# Patient Record
Sex: FEMALE | Race: WHITE | Employment: FULL TIME | URBAN - METROPOLITAN AREA
[De-identification: names, ages, dates, MRNs, and addresses within clinical notes are randomized per-mention and may not be internally consistent; named-entity substitution may affect disease eponyms.]

---

## 2018-01-18 ENCOUNTER — APPOINTMENT (OUTPATIENT)
Dept: GENERAL RADIOLOGY | Facility: HOSPITAL | Age: 58
End: 2018-01-18
Attending: EMERGENCY MEDICINE

## 2018-01-18 ENCOUNTER — APPOINTMENT (OUTPATIENT)
Dept: CV DIAGNOSTICS | Facility: HOSPITAL | Age: 58
End: 2018-01-18
Attending: EMERGENCY MEDICINE

## 2018-01-18 ENCOUNTER — HOSPITAL ENCOUNTER (EMERGENCY)
Facility: HOSPITAL | Age: 58
Discharge: HOME | End: 2018-01-18
Attending: EMERGENCY MEDICINE

## 2018-01-18 VITALS
RESPIRATION RATE: 20 BRPM | SYSTOLIC BLOOD PRESSURE: 135 MMHG | OXYGEN SATURATION: 98 % | DIASTOLIC BLOOD PRESSURE: 89 MMHG | BODY MASS INDEX: 30.53 KG/M2 | WEIGHT: 190 LBS | HEIGHT: 66 IN | TEMPERATURE: 99 F | HEART RATE: 63 BPM

## 2018-01-18 DIAGNOSIS — R07.89 CHEST PAIN, ATYPICAL: Primary | ICD-10-CM

## 2018-01-18 LAB
ALBUMIN SERPL-MCNC: 3.9 G/DL (ref 3.5–4.8)
ALP LIVER SERPL-CCNC: 64 U/L (ref 46–118)
ALT SERPL-CCNC: 98 U/L (ref 14–54)
APTT PPP: 37.2 SECONDS (ref 25–34)
AST SERPL-CCNC: 70 U/L (ref 15–41)
BASOPHILS # BLD AUTO: 0.03 X10(3) UL (ref 0–0.1)
BASOPHILS NFR BLD AUTO: 0.8 %
BILIRUB SERPL-MCNC: 1.1 MG/DL (ref 0.1–2)
BUN BLD-MCNC: 11 MG/DL (ref 8–20)
CALCIUM BLD-MCNC: 8.7 MG/DL (ref 8.3–10.3)
CHLORIDE: 108 MMOL/L (ref 101–111)
CO2: 24 MMOL/L (ref 22–32)
CREAT BLD-MCNC: 0.68 MG/DL (ref 0.55–1.02)
D-DIMER: <0.27 UG/ML FEU (ref 0–0.49)
EOSINOPHIL # BLD AUTO: 0.08 X10(3) UL (ref 0–0.3)
EOSINOPHIL NFR BLD AUTO: 2.2 %
ERYTHROCYTE [DISTWIDTH] IN BLOOD BY AUTOMATED COUNT: 12.2 % (ref 11.5–16)
GLUCOSE BLD-MCNC: 94 MG/DL (ref 70–99)
HCT VFR BLD AUTO: 41.6 % (ref 34–50)
HGB BLD-MCNC: 14.7 G/DL (ref 12–16)
IMMATURE GRANULOCYTE COUNT: 0.01 X10(3) UL (ref 0–1)
IMMATURE GRANULOCYTE RATIO %: 0.3 %
INR BLD: 1 (ref 0.89–1.11)
LYMPHOCYTES # BLD AUTO: 1.44 X10(3) UL (ref 0.9–4)
LYMPHOCYTES NFR BLD AUTO: 39.6 %
M PROTEIN MFR SERPL ELPH: 7.3 G/DL (ref 6.1–8.3)
MCH RBC QN AUTO: 32 PG (ref 27–33.2)
MCHC RBC AUTO-ENTMCNC: 35.3 G/DL (ref 31–37)
MCV RBC AUTO: 90.4 FL (ref 81–100)
MONOCYTES # BLD AUTO: 0.35 X10(3) UL (ref 0.1–0.6)
MONOCYTES NFR BLD AUTO: 9.6 %
NEUTROPHIL ABS PRELIM: 1.73 X10 (3) UL (ref 1.3–6.7)
NEUTROPHILS # BLD AUTO: 1.73 X10(3) UL (ref 1.3–6.7)
NEUTROPHILS NFR BLD AUTO: 47.5 %
PLATELET # BLD AUTO: 178 10(3)UL (ref 150–450)
POTASSIUM SERPL-SCNC: 3.9 MMOL/L (ref 3.6–5.1)
PSA SERPL DL<=0.01 NG/ML-MCNC: 13.2 SECONDS (ref 12–14.3)
RBC # BLD AUTO: 4.6 X10(6)UL (ref 3.8–5.1)
RED CELL DISTRIBUTION WIDTH-SD: 40.1 FL (ref 35.1–46.3)
SODIUM SERPL-SCNC: 140 MMOL/L (ref 136–144)
TROPONIN: <0.046 NG/ML (ref ?–0.05)
WBC # BLD AUTO: 3.6 X10(3) UL (ref 4–13)

## 2018-01-18 PROCEDURE — 93010 ELECTROCARDIOGRAM REPORT: CPT

## 2018-01-18 PROCEDURE — 93005 ELECTROCARDIOGRAM TRACING: CPT

## 2018-01-18 PROCEDURE — 93018 CV STRESS TEST I&R ONLY: CPT | Performed by: EMERGENCY MEDICINE

## 2018-01-18 PROCEDURE — 99285 EMERGENCY DEPT VISIT HI MDM: CPT

## 2018-01-18 PROCEDURE — 93017 CV STRESS TEST TRACING ONLY: CPT | Performed by: EMERGENCY MEDICINE

## 2018-01-18 PROCEDURE — 85610 PROTHROMBIN TIME: CPT | Performed by: EMERGENCY MEDICINE

## 2018-01-18 PROCEDURE — 85378 FIBRIN DEGRADE SEMIQUANT: CPT | Performed by: EMERGENCY MEDICINE

## 2018-01-18 PROCEDURE — 93350 STRESS TTE ONLY: CPT | Performed by: EMERGENCY MEDICINE

## 2018-01-18 PROCEDURE — 80053 COMPREHEN METABOLIC PANEL: CPT | Performed by: EMERGENCY MEDICINE

## 2018-01-18 PROCEDURE — 36415 COLL VENOUS BLD VENIPUNCTURE: CPT

## 2018-01-18 PROCEDURE — 85730 THROMBOPLASTIN TIME PARTIAL: CPT | Performed by: EMERGENCY MEDICINE

## 2018-01-18 PROCEDURE — 85025 COMPLETE CBC W/AUTO DIFF WBC: CPT | Performed by: EMERGENCY MEDICINE

## 2018-01-18 PROCEDURE — 71045 X-RAY EXAM CHEST 1 VIEW: CPT | Performed by: EMERGENCY MEDICINE

## 2018-01-18 PROCEDURE — 84484 ASSAY OF TROPONIN QUANT: CPT | Performed by: EMERGENCY MEDICINE

## 2018-01-18 RX ORDER — ASPIRIN 81 MG/1
324 TABLET, CHEWABLE ORAL ONCE
Status: COMPLETED | OUTPATIENT
Start: 2018-01-18 | End: 2018-01-18

## 2018-01-18 NOTE — ED PROVIDER NOTES
Patient Seen in: [facility] Emergency Department    History   Patient presents with:  Chest Pain Angina (cardiovascular)    Stated Complaint:     HPI    55-year-old female presents for evaluation of chest pain.   Patient has had chest pain for approx (Temporal)   Resp 16   Ht 167.6 cm (5' 6\")   Wt 86.2 kg   SpO2 96%   BMI 30.67 kg/m²         Physical Exam    General: Alert, oriented, no apparent distress  HEENT: Atraumatic, normocephalic. Pupils equal reactive. Extraocular motions intact.  Oropharynx Please view results for these tests on the individual orders. RAINBOW DRAW BLUE   RAINBOW DRAW LAVENDER   RAINBOW DRAW LIGHT GREEN   RAINBOW DRAW GOLD     EKG    Rate, intervals and axes as noted on EKG Report.   Rate: 69  Rhythm: Sinus Rh

## 2018-01-19 LAB
ATRIAL RATE: 69 BPM
P AXIS: 39 DEGREES
P-R INTERVAL: 118 MS
Q-T INTERVAL: 410 MS
QRS DURATION: 84 MS
QTC CALCULATION (BEZET): 439 MS
R AXIS: 28 DEGREES
T AXIS: 49 DEGREES
VENTRICULAR RATE: 69 BPM

## 2023-02-11 ENCOUNTER — OFFICE VISIT (OUTPATIENT)
Dept: INTERNAL MEDICINE CLINIC | Facility: CLINIC | Age: 63
End: 2023-02-11
Payer: COMMERCIAL

## 2023-02-11 VITALS
HEIGHT: 66 IN | TEMPERATURE: 99 F | BODY MASS INDEX: 30.37 KG/M2 | WEIGHT: 189 LBS | DIASTOLIC BLOOD PRESSURE: 72 MMHG | HEART RATE: 66 BPM | SYSTOLIC BLOOD PRESSURE: 124 MMHG

## 2023-02-11 DIAGNOSIS — M75.101 BILATERAL ROTATOR CUFF SYNDROME: ICD-10-CM

## 2023-02-11 DIAGNOSIS — M75.102 BILATERAL ROTATOR CUFF SYNDROME: ICD-10-CM

## 2023-02-11 DIAGNOSIS — Z85.3 HISTORY OF BREAST CANCER: ICD-10-CM

## 2023-02-11 DIAGNOSIS — H81.10 BENIGN PAROXYSMAL POSITIONAL VERTIGO, UNSPECIFIED LATERALITY: ICD-10-CM

## 2023-02-11 DIAGNOSIS — R10.9 CHRONIC ABDOMINAL PAIN: ICD-10-CM

## 2023-02-11 DIAGNOSIS — Z12.4 SCREENING FOR CERVICAL CANCER: ICD-10-CM

## 2023-02-11 DIAGNOSIS — M54.16 LUMBAR RADICULOPATHY: ICD-10-CM

## 2023-02-11 DIAGNOSIS — R59.0 MEDIASTINAL LYMPHADENOPATHY: Primary | ICD-10-CM

## 2023-02-11 DIAGNOSIS — G89.29 CHRONIC ABDOMINAL PAIN: ICD-10-CM

## 2023-02-11 DIAGNOSIS — Z90.13 H/O MASTECTOMY, BILATERAL: ICD-10-CM

## 2023-02-11 PROCEDURE — 3078F DIAST BP <80 MM HG: CPT | Performed by: INTERNAL MEDICINE

## 2023-02-11 PROCEDURE — 3074F SYST BP LT 130 MM HG: CPT | Performed by: INTERNAL MEDICINE

## 2023-02-11 PROCEDURE — 3008F BODY MASS INDEX DOCD: CPT | Performed by: INTERNAL MEDICINE

## 2023-02-11 PROCEDURE — 99205 OFFICE O/P NEW HI 60 MIN: CPT | Performed by: INTERNAL MEDICINE

## 2023-02-22 ENCOUNTER — TELEPHONE (OUTPATIENT)
Dept: INTERNAL MEDICINE CLINIC | Facility: CLINIC | Age: 63
End: 2023-02-22

## 2023-02-22 DIAGNOSIS — R59.0 MEDIASTINAL LYMPHADENOPATHY: Primary | ICD-10-CM

## 2023-02-22 NOTE — TELEPHONE ENCOUNTER
The request for pt's CT Chest/ABD/Pelvis has been denied by ins. Pt has been notified. Ins reasoning as follows:     A COPY OF PREVIOUS CT ABD/PELVIS FROM GEORGA/2021 HAS BEEN FAXED TO [facility]. ALSO, THE FAX INCLUDES INFO STATING CT CHEST HAS NOT BEEN PERFORMED.     The follow is the additional information received on denial letter:

## 2023-02-24 NOTE — TELEPHONE ENCOUNTER
Please notify the patient that insurance has denied this study. CT chest to evaluate mediastinal lymphadenopathy has been ordered. We will have to delay further imaging until undergoes evaluation by oncology service.

## 2023-03-01 ENCOUNTER — TELEPHONE (OUTPATIENT)
Dept: INTERNAL MEDICINE CLINIC | Facility: CLINIC | Age: 63
End: 2023-03-01

## 2023-03-14 NOTE — TELEPHONE ENCOUNTER
Kiara Mejia at [facility] Referrals was able to authorize the CT Chest Abd Pelvis. LM to that affect at [phone] for pt. Cancelled the CT Chest contrast only. FYI to AD.

## 2023-03-14 NOTE — TELEPHONE ENCOUNTER
Message sent to [facility] Referrals: Good morning Danny Smiling,  would you be able to look at this pt's referrals [id number] and [id number]. The CT Chest, Abd, Pelvis testing appeared to have been denied so Dr Shania Carballo ordered a CT Chest contrast only but that appears to have been denied. Pt is scheduled for 3/16/23 for the CT Chest Abdomen Pelvis. Please help.   Sandy Morgan

## 2023-03-16 ENCOUNTER — HOSPITAL ENCOUNTER (OUTPATIENT)
Dept: CT IMAGING | Age: 63
Discharge: HOME | End: 2023-03-16
Attending: INTERNAL MEDICINE
Payer: COMMERCIAL

## 2023-03-16 DIAGNOSIS — R59.0 MEDIASTINAL LYMPHADENOPATHY: ICD-10-CM

## 2023-03-16 DIAGNOSIS — G89.29 CHRONIC ABDOMINAL PAIN: ICD-10-CM

## 2023-03-16 DIAGNOSIS — Z85.3 HISTORY OF BREAST CANCER: ICD-10-CM

## 2023-03-16 DIAGNOSIS — R10.9 CHRONIC ABDOMINAL PAIN: ICD-10-CM

## 2023-03-16 LAB
CREAT BLD-MCNC: 0.7 MG/DL
GFR SERPLBLD BASED ON 1.73 SQ M-ARVRAT: 98 ML/MIN/1.73M2 (ref 60–?)

## 2023-03-16 PROCEDURE — 82565 ASSAY OF CREATININE: CPT

## 2023-03-16 PROCEDURE — 71260 CT THORAX DX C+: CPT | Performed by: INTERNAL MEDICINE

## 2023-03-16 PROCEDURE — 74177 CT ABD & PELVIS W/CONTRAST: CPT | Performed by: INTERNAL MEDICINE

## 2023-03-27 ENCOUNTER — TELEPHONE (OUTPATIENT)
Dept: INTERNAL MEDICINE CLINIC | Facility: CLINIC | Age: 63
End: 2023-03-27

## 2023-03-27 DIAGNOSIS — R59.0 MEDIASTINAL LYMPHADENOPATHY: Primary | ICD-10-CM

## 2023-03-27 DIAGNOSIS — R91.1 NODULE OF LEFT LUNG: ICD-10-CM

## 2023-03-27 DIAGNOSIS — E27.8 ADRENAL NODULE (HCC): ICD-10-CM

## 2023-03-27 NOTE — TELEPHONE ENCOUNTER
Orders are entered incorrectly . PET STANDARD BODY SCAN (ONCOLOGY) (CPT = [id number]) ([id number]) he called and said this is the correct way to be entered.

## 2023-03-27 NOTE — TELEPHONE ENCOUNTER
Mariaa Khan reports testing ordered as standard which he reports he got a message from AD ok to change so we was calling our office to update. Dx codes reflected on new order. Mariaa Khan with nuc med aware of order in system and he will contact patient to schedule.

## 2023-03-31 ENCOUNTER — HOSPITAL ENCOUNTER (OUTPATIENT)
Dept: NUCLEAR MEDICINE | Facility: HOSPITAL | Age: 63
Discharge: HOME | End: 2023-03-31
Attending: INTERNAL MEDICINE
Payer: COMMERCIAL

## 2023-03-31 DIAGNOSIS — R59.0 MEDIASTINAL LYMPHADENOPATHY: ICD-10-CM

## 2023-03-31 DIAGNOSIS — R91.1 NODULE OF LEFT LUNG: ICD-10-CM

## 2023-03-31 DIAGNOSIS — E27.8 ADRENAL NODULE (HCC): ICD-10-CM

## 2023-03-31 LAB — GLUCOSE BLD-MCNC: 104 MG/DL (ref 70–99)

## 2023-03-31 PROCEDURE — 82962 GLUCOSE BLOOD TEST: CPT

## 2023-03-31 PROCEDURE — 78815 PET IMAGE W/CT SKULL-THIGH: CPT | Performed by: INTERNAL MEDICINE

## 2023-09-25 ENCOUNTER — E-VISIT (OUTPATIENT)
Dept: TELEHEALTH | Age: 63
End: 2023-09-25
Payer: COMMERCIAL

## 2023-09-25 DIAGNOSIS — B37.31 VAGINAL YEAST INFECTION: Primary | ICD-10-CM

## 2023-09-25 RX ORDER — FLUCONAZOLE 150 MG/1
150 TABLET ORAL ONCE
Qty: 1 TABLET | Refills: 0 | Status: SHIPPED | OUTPATIENT
Start: 2023-09-25 | End: 2023-09-25

## 2023-09-27 ENCOUNTER — PATIENT MESSAGE (OUTPATIENT)
Dept: INTERNAL MEDICINE CLINIC | Facility: CLINIC | Age: 63
End: 2023-09-27

## 2023-09-27 DIAGNOSIS — R59.0 MEDIASTINAL LYMPHADENOPATHY: Primary | ICD-10-CM

## 2023-09-28 ENCOUNTER — TELEPHONE (OUTPATIENT)
Dept: INTERNAL MEDICINE CLINIC | Facility: CLINIC | Age: 63
End: 2023-09-28

## 2023-09-28 ENCOUNTER — OFFICE VISIT (OUTPATIENT)
Facility: CLINIC | Age: 63
End: 2023-09-28
Payer: COMMERCIAL

## 2023-09-28 VITALS — SYSTOLIC BLOOD PRESSURE: 138 MMHG | HEART RATE: 63 BPM | OXYGEN SATURATION: 99 % | DIASTOLIC BLOOD PRESSURE: 76 MMHG

## 2023-09-28 DIAGNOSIS — E27.8 ADRENAL NODULE (HCC): Primary | ICD-10-CM

## 2023-09-28 DIAGNOSIS — R53.83 OTHER FATIGUE: ICD-10-CM

## 2023-09-28 PROCEDURE — 3075F SYST BP GE 130 - 139MM HG: CPT | Performed by: STUDENT IN AN ORGANIZED HEALTH CARE EDUCATION/TRAINING PROGRAM

## 2023-09-28 PROCEDURE — 3078F DIAST BP <80 MM HG: CPT | Performed by: STUDENT IN AN ORGANIZED HEALTH CARE EDUCATION/TRAINING PROGRAM

## 2023-09-28 PROCEDURE — 99205 OFFICE O/P NEW HI 60 MIN: CPT | Performed by: STUDENT IN AN ORGANIZED HEALTH CARE EDUCATION/TRAINING PROGRAM

## 2023-09-28 RX ORDER — DEXAMETHASONE 1 MG
1 TABLET ORAL ONCE
Qty: 1 TABLET | Refills: 0 | Status: SHIPPED | OUTPATIENT
Start: 2023-09-28 | End: 2023-09-28

## 2023-09-28 NOTE — TELEPHONE ENCOUNTER
From: Rachel Kumari  To: Tc Bolivar  Sent: 9/27/2023 6:14 PM CDT  Subject: Auto immune panel blood work    Hi, I may have missed it in [address] but I thought we were going to order the auto immune panel bloodwork. It took a few months waiting for an appointment with the endocrinologist Dr Balaji Alaniz because they were backed up from February until October 12. They then hired Dr. Jacquelin Luna and she is going to see me tomorrow at 11 AM. I thought it was better to be seen sooner than later, although we are very close to October 12 now. There will be blood work after her appointment. Can you schedule me for the auto immune panel to have it done at the lab at the same time?      Thank you, Jalil Granger

## 2023-09-28 NOTE — PATIENT INSTRUCTIONS
Return Visit   [ x ] Physician in 2 months   [  ] In person or video visit  [  ] In person only    [ x ] After visit summary   [ x ] Placed labs/imaging. Labs are to be drawn at [address] and fasting. [ x ] Central scheduling # for ultrasound/nuclear med/CT/MRI/DXA  [  ] Directions to 1st floor lab  [  ] Med rep info for:  [  ] Dental clearance form  [ x ] Will need authorization for outside records  [  ] Give blood sugar log  [  ] Other: It was great meeting you today!     Today we discussed your adrenal nodule:  -We will first start with a biochemical evaluation regarding if your nodule is making excess hormones  -I want you to get your labs done in the following order:    Day 1 morning (8AM)  Fasting Renin + Aldosterone   DHEAS  Metanephrines   HbA1c  TSH and FT4    Day 1 night (at 11PM)  Take dexamethasone 1mg    Day 2 morning (8-9AM)  Fasting ACTH  Fasting Cortisol    -Once all your labs result I can let you know the next steps    Take care!  -Dr. Lois Marcial

## 2023-09-29 NOTE — TELEPHONE ENCOUNTER
Please see other TE to . Patient is overdue for CPE. Only seen once 7 months ago for multiple concerns. I do not have a diagnosis for autoimmune labs, but am happy to order. Need ov or cpe to determine a diagnosis for insurance. Again, also need cpe or follow-up. 40 minutes. I can add on patient if needed.

## 2023-09-30 ENCOUNTER — LAB ENCOUNTER (OUTPATIENT)
Dept: LAB | Age: 63
End: 2023-09-30
Attending: INTERNAL MEDICINE
Payer: COMMERCIAL

## 2023-09-30 DIAGNOSIS — E27.8 ADRENAL NODULE (HCC): Primary | ICD-10-CM

## 2023-09-30 LAB
ANION GAP SERPL CALC-SCNC: 6 MMOL/L (ref 0–18)
BUN BLD-MCNC: 12 MG/DL (ref 7–18)
BUN/CREAT SERPL: 18.2 (ref 10–20)
CALCIUM BLD-MCNC: 8.8 MG/DL (ref 8.5–10.1)
CHLORIDE SERPL-SCNC: 114 MMOL/L (ref 98–112)
CO2 SERPL-SCNC: 25 MMOL/L (ref 21–32)
CREAT BLD-MCNC: 0.66 MG/DL
DHEA-S SERPL-MCNC: 39.1 UG/DL
EGFRCR SERPLBLD CKD-EPI 2021: 99 ML/MIN/1.73M2 (ref 60–?)
EST. AVERAGE GLUCOSE BLD GHB EST-MCNC: 100 MG/DL (ref 68–126)
FASTING STATUS PATIENT QL REPORTED: YES
GLUCOSE BLD-MCNC: 113 MG/DL (ref 70–99)
HBA1C MFR BLD: 5.1 % (ref ?–5.7)
OSMOLALITY SERPL CALC.SUM OF ELEC: 301 MOSM/KG (ref 275–295)
POTASSIUM SERPL-SCNC: 4 MMOL/L (ref 3.5–5.1)
SODIUM SERPL-SCNC: 145 MMOL/L (ref 136–145)
T4 FREE SERPL-MCNC: 0.8 NG/DL (ref 0.8–1.7)
TSI SER-ACNC: 2.45 MIU/ML (ref 0.36–3.74)
VIT D+METAB SERPL-MCNC: 25.5 NG/ML (ref 30–100)

## 2023-09-30 PROCEDURE — 80048 BASIC METABOLIC PNL TOTAL CA: CPT | Performed by: STUDENT IN AN ORGANIZED HEALTH CARE EDUCATION/TRAINING PROGRAM

## 2023-09-30 PROCEDURE — 82088 ASSAY OF ALDOSTERONE: CPT

## 2023-09-30 PROCEDURE — 83036 HEMOGLOBIN GLYCOSYLATED A1C: CPT | Performed by: STUDENT IN AN ORGANIZED HEALTH CARE EDUCATION/TRAINING PROGRAM

## 2023-09-30 PROCEDURE — 84443 ASSAY THYROID STIM HORMONE: CPT | Performed by: STUDENT IN AN ORGANIZED HEALTH CARE EDUCATION/TRAINING PROGRAM

## 2023-09-30 PROCEDURE — 82306 VITAMIN D 25 HYDROXY: CPT

## 2023-09-30 PROCEDURE — 82627 DEHYDROEPIANDROSTERONE: CPT | Performed by: STUDENT IN AN ORGANIZED HEALTH CARE EDUCATION/TRAINING PROGRAM

## 2023-09-30 PROCEDURE — 84439 ASSAY OF FREE THYROXINE: CPT | Performed by: STUDENT IN AN ORGANIZED HEALTH CARE EDUCATION/TRAINING PROGRAM

## 2023-09-30 PROCEDURE — 80299 QUANTITATIVE ASSAY DRUG: CPT

## 2023-09-30 PROCEDURE — 83835 ASSAY OF METANEPHRINES: CPT

## 2023-09-30 PROCEDURE — 84244 ASSAY OF RENIN: CPT

## 2023-10-01 ENCOUNTER — LAB ENCOUNTER (OUTPATIENT)
Dept: LAB | Age: 63
End: 2023-10-01
Attending: INTERNAL MEDICINE
Payer: COMMERCIAL

## 2023-10-01 DIAGNOSIS — E27.8 ADRENAL NODULE (HCC): Primary | ICD-10-CM

## 2023-10-01 LAB — CORTIS SERPL-MCNC: 1.1 UG/DL

## 2023-10-01 PROCEDURE — 82024 ASSAY OF ACTH: CPT

## 2023-10-01 PROCEDURE — 82533 TOTAL CORTISOL: CPT | Performed by: STUDENT IN AN ORGANIZED HEALTH CARE EDUCATION/TRAINING PROGRAM

## 2023-10-03 LAB — ACTH: 1.8 PG/ML

## 2023-10-04 ENCOUNTER — TELEPHONE (OUTPATIENT)
Dept: INTERNAL MEDICINE CLINIC | Facility: CLINIC | Age: 63
End: 2023-10-04

## 2023-10-04 DIAGNOSIS — Z00.00 ROUTINE GENERAL MEDICAL EXAMINATION AT A HEALTH CARE FACILITY: Primary | ICD-10-CM

## 2023-10-04 DIAGNOSIS — Z13.220 SCREENING FOR LIPID DISORDERS: ICD-10-CM

## 2023-10-04 DIAGNOSIS — Z13.29 SCREENING FOR THYROID DISORDER: ICD-10-CM

## 2023-10-04 DIAGNOSIS — Z13.0 SCREENING FOR DISORDER OF BLOOD AND BLOOD-FORMING ORGANS: ICD-10-CM

## 2023-10-04 DIAGNOSIS — Z13.228 SCREENING FOR METABOLIC DISORDER: ICD-10-CM

## 2023-10-05 LAB — RENIN ACTIVITY: 0.37 NG/ML/HR

## 2023-10-07 ENCOUNTER — LAB ENCOUNTER (OUTPATIENT)
Dept: LAB | Age: 63
End: 2023-10-07
Attending: INTERNAL MEDICINE
Payer: COMMERCIAL

## 2023-10-07 DIAGNOSIS — R59.0 MEDIASTINAL LYMPHADENOPATHY: Primary | ICD-10-CM

## 2023-10-07 LAB
CRP SERPL-MCNC: <0.29 MG/DL (ref ?–0.3)
ERYTHROCYTE [SEDIMENTATION RATE] IN BLOOD: 2 MM/HR
RHEUMATOID FACT SERPL-ACNC: <10 IU/ML (ref ?–15)
URATE SERPL-MCNC: 5.2 MG/DL

## 2023-10-07 PROCEDURE — 86431 RHEUMATOID FACTOR QUANT: CPT

## 2023-10-07 PROCEDURE — 84550 ASSAY OF BLOOD/URIC ACID: CPT

## 2023-10-07 PROCEDURE — 86200 CCP ANTIBODY: CPT

## 2023-10-07 PROCEDURE — 85652 RBC SED RATE AUTOMATED: CPT

## 2023-10-07 PROCEDURE — 86225 DNA ANTIBODY NATIVE: CPT

## 2023-10-07 PROCEDURE — 86038 ANTINUCLEAR ANTIBODIES: CPT

## 2023-10-07 PROCEDURE — 86140 C-REACTIVE PROTEIN: CPT

## 2023-10-09 LAB
ALDOSTERONE: 4.7 NG/DL
CCP IGG SERPL-ACNC: 1.2 U/ML (ref 0–6.9)
DSDNA IGG SERPL IA-ACNC: 2 IU/ML
ENA AB SER QL IA: 0.2 UG/L
ENA AB SER QL IA: NEGATIVE

## 2023-10-10 LAB
DEXAMETHASONE, SERUM: <30 NG/DL
METANEPHRINE: 33.1 PG/ML
NORMETANEPHRINE: 48.7 PG/ML

## 2023-11-14 ENCOUNTER — TELEPHONE (OUTPATIENT)
Facility: CLINIC | Age: 63
End: 2023-11-14

## 2023-11-14 DIAGNOSIS — Z85.3 HISTORY OF BREAST CANCER: ICD-10-CM

## 2023-11-14 DIAGNOSIS — E27.8 ADRENAL NODULE (HCC): Primary | ICD-10-CM

## 2023-11-14 DIAGNOSIS — N63.0 MASS OF BREAST, UNSPECIFIED LATERALITY: ICD-10-CM

## 2023-11-14 NOTE — TELEPHONE ENCOUNTER
Received call back from [facility] with [facility]- states spoke with patient- she wishes to have done through [facility]. Routing to Dr. Alexandrea Beyer for new order to be entered.

## 2023-11-14 NOTE — TELEPHONE ENCOUNTER
Phoned Imaging at [id number], spoke with Terri Huerta. States for one hour prior to scan- water only to drink, should not eat. States patient will have scan done, then radiologist will review and determine if contrast needed- if so will get contrast and then scanned again. [employer] response sent to patient.

## 2023-11-14 NOTE — TELEPHONE ENCOUNTER
Received phone call from [facility] requesting a copy of order for patient's imaging scheduled tomorrow, 11/15, CT Abdomen W+W/o. Phoned patient to confirm this is where order should be sent. Patient states she was originally contacted by [facility] to schedule CT Abdomen- they were unable to schedule her at [facility] location for some reason, and then she was scheduled in [facility] at [facility]. Now they are saying they need a copy of the order and patient is unsure- why was she sent to [facility]? Why don't they have the order? Phoned [facility], spoke with Myron. She states that [facility] is one location they will out source to if unable to schedule in house- but patient should not have been scheduled all the way in [facility]. She will call patient directly and offer appointment tomorrow with [facility] location.

## 2023-11-14 NOTE — TELEPHONE ENCOUNTER
Patient called back office. She does not want order through [facility]. Reviewed order entered through [employer] message also sent to patient. Closing this encounter.

## 2023-11-20 NOTE — TELEPHONE ENCOUNTER
Phoned patient and reviewed below- Dr. Val Ni sent a message to her PCP Dr. Oralia Desir, should follow up with his office regarding order for breast imaging. Patient verbalized understanding. Asking for referral to Oncologist- reviewed this would also come from Dr. Oralia Desir, but can send Abraham's website link via [address] for her to review if wanted. Closing this encounter.

## 2023-11-20 NOTE — TELEPHONE ENCOUNTER
I can order it, however, I am not sure if this is the best modality to evaluate her breast area. I will place the order but I would recommend she discuss with her oncologist if there is other type of imaging they would require/recommend (ultrasound, mammogram, etc).  Let me know if she still wants me to place the CT chest or if she would like to discuss with her oncologist first. Thanks

## 2023-11-20 NOTE — TELEPHONE ENCOUNTER
I have forwarded her message and concerns to her primary care, Dr. Severino Lara. He should let the patient know what type of imaging is preferred. I did let him know that she is going for a CT on 11/24 so if he feels CT is best then he can add on chest CT for that time.

## 2023-11-21 NOTE — TELEPHONE ENCOUNTER
Nannette Lerma,  I agree that an alternate imagining modality would be preferred. I will reach out to the patient.    Thank you!  -Tc

## 2023-11-24 ENCOUNTER — HOSPITAL ENCOUNTER (OUTPATIENT)
Dept: CT IMAGING | Age: 63
Discharge: HOME | End: 2023-11-24
Attending: STUDENT IN AN ORGANIZED HEALTH CARE EDUCATION/TRAINING PROGRAM
Payer: COMMERCIAL

## 2023-11-24 DIAGNOSIS — E27.8 ADRENAL NODULE (HCC): ICD-10-CM

## 2023-11-24 LAB
CREAT BLD-MCNC: 0.7 MG/DL
EGFRCR SERPLBLD CKD-EPI 2021: 98 ML/MIN/1.73M2 (ref 60–?)

## 2023-11-24 PROCEDURE — 82565 ASSAY OF CREATININE: CPT

## 2023-11-24 PROCEDURE — 74170 CT ABD WO CNTRST FLWD CNTRST: CPT | Performed by: STUDENT IN AN ORGANIZED HEALTH CARE EDUCATION/TRAINING PROGRAM

## 2023-11-28 ENCOUNTER — TELEPHONE (OUTPATIENT)
Dept: INTERNAL MEDICINE CLINIC | Facility: CLINIC | Age: 63
End: 2023-11-28

## 2023-11-28 ENCOUNTER — OFFICE VISIT (OUTPATIENT)
Facility: CLINIC | Age: 63
End: 2023-11-28
Payer: COMMERCIAL

## 2023-11-28 VITALS
DIASTOLIC BLOOD PRESSURE: 76 MMHG | BODY MASS INDEX: 31 KG/M2 | HEIGHT: 66 IN | HEART RATE: 76 BPM | OXYGEN SATURATION: 96 % | SYSTOLIC BLOOD PRESSURE: 128 MMHG

## 2023-11-28 DIAGNOSIS — E27.8 ADRENAL NODULE (HCC): Primary | ICD-10-CM

## 2023-11-28 DIAGNOSIS — Z85.3 HISTORY OF BREAST CANCER: Primary | ICD-10-CM

## 2023-11-28 PROCEDURE — 99214 OFFICE O/P EST MOD 30 MIN: CPT | Performed by: STUDENT IN AN ORGANIZED HEALTH CARE EDUCATION/TRAINING PROGRAM

## 2023-11-28 PROCEDURE — 3074F SYST BP LT 130 MM HG: CPT | Performed by: STUDENT IN AN ORGANIZED HEALTH CARE EDUCATION/TRAINING PROGRAM

## 2023-11-28 PROCEDURE — 3078F DIAST BP <80 MM HG: CPT | Performed by: STUDENT IN AN ORGANIZED HEALTH CARE EDUCATION/TRAINING PROGRAM

## 2023-11-28 RX ORDER — DEXAMETHASONE 1 MG
1 TABLET ORAL ONCE
Qty: 1 TABLET | Refills: 0 | Status: SHIPPED | OUTPATIENT
Start: 2023-11-28 | End: 2023-11-28

## 2023-11-28 NOTE — TELEPHONE ENCOUNTER
Dr. Nikki Lei not coming up on referrals list. Pended referral with name and location information in comment on referral. Will reach out to [facility] for auth once signed.

## 2023-11-28 NOTE — PATIENT INSTRUCTIONS
Return Visit   [ x ] Physician in 13 months   [  ] In person or video visit  [  ] In person only    [ x ] After visit summary   [ x ] Placed labs/imaging. Labs are to be drawn at [address] and fasting.    [ x ] Central scheduling # for ultrasound/nuclear med/CT/MRI/DXA  [  ] Directions to 1st floor lab       It was great seeing you today!    -Today we discussed your adrenal nodule:  -We reviewed your biochemical blood work   -We also reviewed your imaging which is consistent with an adrenal adenoma  -Since your adrenal nodule has been present since 10/2021, I recommend you get a follow up CT in 1 year (around 10/2024 onwards)  -I also want you to get your labs done in the following order:     Day 1 night (at 11PM)  Take dexamethasone 1mg     Day 2 morning (8-9AM)  Fasting ACTH  Fasting Cortisol  Fasting Dexamethasone     Take care!  -Dr. Poonam Alvarez

## 2023-11-28 NOTE — TELEPHONE ENCOUNTER
Please see MCM from today from pt. She has appt with Dr. Shanna Mata (oncologist) with [facility] tomorrow at 11:40 and needs name on referral changed.  Looks like referral is still open though regardless

## 2023-11-29 ENCOUNTER — TELEPHONE (OUTPATIENT)
Dept: INTERNAL MEDICINE CLINIC | Facility: CLINIC | Age: 63
End: 2023-11-29

## 2023-11-29 NOTE — TELEPHONE ENCOUNTER
Ref# [id number] - Authorized  Received: Today  Echo Valentine [facility] Clinical Staff  Hello,    I was able to get this referral authorized. Patient has Urgent Appointment for today November 29,2023 at 1:30pm.  Please Fax Approval to [phone] Att: Radha Pimentel.       Thank you,  [facility]  Patient Referral Specialist   Referral  Referral # [id number]            Referral Information    Referral # Creation Date Referral Status Status Update    [id number] 11/28/2023 Authorized 11/29/2023: Status History        Status Reason Referral Type Referral Reasons Referral [facility]   Health Plan Approved OFFICE VISIT none Internal        To Specialty To Provider To Location/Place of Service To Department   ONCOLOGY none none none        To Vendor Referred By By Location/Place of Service By Department   none Anabel Kraft MD [address] [place] [address]        Priority Start Date Expiration Date Referral Entered By   Urgent 11/28/2023 05/26/2024 Anabel Kraft MD        Visits Requested Visits Authorized Visits Completed Visits Scheduled   6 6           Procedure Information    Service Details  Procedure Modifiers Revenue Code Provider Requested Approved   [id number] - OP REFERRAL TO Miguel Roberts none none  1 1       Diagnosis Information    Diagnosis   Z85.3 (ICD-10-CM) - V10.3 (ICD-9-CM) - History of breast cancer

## 2023-11-29 NOTE — TELEPHONE ENCOUNTER
Referral faxed to [facility] at [phone] as requested. Confirmation received.

## 2023-12-08 ENCOUNTER — TELEPHONE (OUTPATIENT)
Dept: INTERNAL MEDICINE CLINIC | Facility: CLINIC | Age: 63
End: 2023-12-08

## 2023-12-08 DIAGNOSIS — C50.912 MALIGNANT NEOPLASM OF LEFT FEMALE BREAST, UNSPECIFIED ESTROGEN RECEPTOR STATUS, UNSPECIFIED SITE OF BREAST (HCC): Primary | ICD-10-CM

## 2023-12-08 NOTE — TELEPHONE ENCOUNTER
Jyoti Referrals Specialist from [facility] calling to request referral from AD.  Patient will be seeing Dr. Hanson for breast surgery due to breast cancer.    Specialty: Surgery    Full Name of Specialist: Dr. Hanson    Name of the Provider Group:  Address: [address] [place] [address]  Phone number:   Fax number :  NPI:  [id number]    (NPI number of the service provider.  the nurses need this information for the referral.  Its for service providers only like Surgery*, [employer], [facility], Etc.  We not NOT need physician NPI's)    *Surgery:The NPI # should be of the location of the Surgery.    Date of Appointment:  12/13/23    Reason for the Appointment (be specific with diagnosis code):  C50.912,  CPT 63066 will need multiple visits    Has the patient seen a provider in our office for stated problem?:  Not susre, patient is being referred by Dr. Shannen Hines.      Is this request for an out of network referral?   (if yes, please have patient contact referring provider and have them fax office visit notes to triage attention):  Notes in Care Everywhere and they were faxed to office.

## 2023-12-08 NOTE — TELEPHONE ENCOUNTER
Patient needs follow-up and cpe. Please call and inquire regarding most recent pap and colon cancer screening.

## 2023-12-11 ENCOUNTER — TELEPHONE (OUTPATIENT)
Dept: INTERNAL MEDICINE CLINIC | Facility: CLINIC | Age: 63
End: 2023-12-11

## 2023-12-11 DIAGNOSIS — R91.1 NODULE OF LEFT LUNG: Primary | ICD-10-CM

## 2023-12-11 NOTE — TELEPHONE ENCOUNTER
Pulm and surgical oncology referral orders authroized.     Referrals authorized and faxed to offices.     Called and notified. Pt very appreciative.

## 2023-12-11 NOTE — TELEPHONE ENCOUNTER
Specialty: Pulmonologist    Full Name of Specialist: Supriya Yanez    Name of the Provider Group: [facility]  Address:  Phone number:  Fax number :  NPI:    (NPI number of the service provider.  the nurses need this information for the referral.  Its for service providers only like Surgery*, [employer], [facility], Etc.  We not NOT need physician NPI's)    *Surgery:The NPI # should be of the location of the Surgery.    Date of Appointment: 12/12/23 8am     Reason for the Appointment (be specific with diagnosis code):  lung nodule, Medistinial lymph node on march imaging    Has the patient seen a provider in our office for stated problem?:  yes  Is this request for an out of network referral?   (if yes, please have patient contact referring provider and have them fax office visit notes to triage attention):

## 2023-12-12 ENCOUNTER — TELEPHONE (OUTPATIENT)
Dept: INTERNAL MEDICINE CLINIC | Facility: CLINIC | Age: 63
End: 2023-12-12

## 2023-12-12 DIAGNOSIS — C50.912 MALIGNANT NEOPLASM OF LEFT FEMALE BREAST, UNSPECIFIED ESTROGEN RECEPTOR STATUS, UNSPECIFIED SITE OF BREAST (HCC): Primary | ICD-10-CM

## 2023-12-12 DIAGNOSIS — Z17.0 ESTROGEN RECEPTOR POSITIVE STATUS (ER+): ICD-10-CM

## 2023-12-12 NOTE — TELEPHONE ENCOUNTER
Pt stated AD asked her to get a colonoscopy done, but pt had declined.  Pt now requesting an order and wants to go to [facility], due to Oncology team being at .  Pt will check with her team there and see who they recommend and call us back with that info.

## 2023-12-12 NOTE — TELEPHONE ENCOUNTER
Received 2 referral requests from [address] [place] [address]. Authorizations faxed to [facility]. Confirmation received. Dr Green Part  Pulmonology for appt 12/12/23 for Consult and Treat. Pended referral.      Dr Odonnell Quincy Specialist for appt 12/13/23 for Consult and Treat.   Pended referral.

## 2023-12-29 ENCOUNTER — TELEPHONE (OUTPATIENT)
Dept: INTERNAL MEDICINE CLINIC | Facility: CLINIC | Age: 63
End: 2023-12-29

## 2023-12-29 NOTE — TELEPHONE ENCOUNTER
Confirmation received, placed in brown accordion folder    Future Appointments   Date Time Provider Department Center   1/5/2024 11:00 AM Michelle Torres,  [facility]

## 2023-12-29 NOTE — TELEPHONE ENCOUNTER
Surgery with  is on 1/9/24 for 4 wall chest cancerous tumors. Fax for  Is [phone] and phone for the  Is [phone]    Paper work is in the brown folder

## 2024-01-04 NOTE — TELEPHONE ENCOUNTER
Zelda anderson/Kallie at [facility] and she confirmed new fax number to fax our form to which is [phone] re faxed to new fax number

## 2024-01-05 ENCOUNTER — OFFICE VISIT (OUTPATIENT)
Dept: INTERNAL MEDICINE CLINIC | Facility: CLINIC | Age: 64
End: 2024-01-05
Payer: COMMERCIAL

## 2024-01-05 VITALS
BODY MASS INDEX: 31.72 KG/M2 | OXYGEN SATURATION: 96 % | RESPIRATION RATE: 18 BRPM | TEMPERATURE: 97 F | SYSTOLIC BLOOD PRESSURE: 122 MMHG | WEIGHT: 197.38 LBS | DIASTOLIC BLOOD PRESSURE: 72 MMHG | HEART RATE: 69 BPM | HEIGHT: 66 IN

## 2024-01-05 DIAGNOSIS — Z01.818 PRE-OP EVALUATION: ICD-10-CM

## 2024-01-05 DIAGNOSIS — Z85.3 HISTORY OF BILATERAL BREAST CANCER: ICD-10-CM

## 2024-01-05 DIAGNOSIS — R05.3 CHRONIC COUGH: Primary | ICD-10-CM

## 2024-01-05 DIAGNOSIS — C50.911 MALIGNANT NEOPLASM OF RIGHT FEMALE BREAST, UNSPECIFIED ESTROGEN RECEPTOR STATUS, UNSPECIFIED SITE OF BREAST (HCC): ICD-10-CM

## 2024-01-05 DIAGNOSIS — L30.9 DERMATITIS: ICD-10-CM

## 2024-01-05 PROCEDURE — 99214 OFFICE O/P EST MOD 30 MIN: CPT | Performed by: INTERNAL MEDICINE

## 2024-01-05 PROCEDURE — 3008F BODY MASS INDEX DOCD: CPT | Performed by: INTERNAL MEDICINE

## 2024-01-05 PROCEDURE — 3078F DIAST BP <80 MM HG: CPT | Performed by: INTERNAL MEDICINE

## 2024-01-05 PROCEDURE — 3074F SYST BP LT 130 MM HG: CPT | Performed by: INTERNAL MEDICINE

## 2024-01-05 RX ORDER — EXEMESTANE 25 MG/1
25 TABLET ORAL DAILY
COMMUNITY
Start: 2023-11-29

## 2024-01-05 RX ORDER — ALBUTEROL SULFATE 90 UG/1
1-2 AEROSOL, METERED RESPIRATORY (INHALATION) EVERY 6 HOURS PRN
COMMUNITY

## 2024-01-05 NOTE — PROGRESS NOTES
Victoria Eric is a 63 year old female     HPI:   The patient has a planned right chest wall resection with possible right axillary sentinel lymph node biopsy with Dr. Aliza Hanson at [facility] on 2024.    Revised Cardiac Risk Index (modified Gomes Index): Class I Risk    How many METS can the patient achieve? The patent can achieve greater than 4 METS.    Complications with anesthesia?:  Yes severe nausea and vomiting for hours.    Current Outpatient Medications   Medication Sig Dispense Refill    albuterol 108 (90 Base) MCG/ACT Inhalation Aero Soln Inhale 1-2 puffs into the lungs every 6 (six) hours as needed for Wheezing.      exemestane 25 MG Oral Tab Take 1 tablet (25 mg total) by mouth daily.      Multiple Vitamin Oral Tab Take 1 tablet by mouth daily.        Allergies:   Allergies   Allergen Reactions    Dairy Products OTHER (SEE COMMENTS)     pimples    Sulfa Antibiotics       Past Medical History:   Diagnosis Date    Anxiety     Breast cancer (HCC) 2012    left breast cancer-LCIS      Past Surgical History:   Procedure Laterality Date    APPENDECTOMY      age 10    BREAST BIOPSY  Aug. 2012    left and right breast          birth of twins    LUMPECTOMY LEFT  Aug. 2012    with lymph node biopsy    MRI BREAST BIOPSY W/ CLIP 1 SITE (CPT=19085)  '    rt breast-benign    OTHER SURGICAL HISTORY      repair of vaginal/rectal tear with childbirth    RADIATION LEFT      REMOVAL OF TONSILS,13+ Y/O      TONSILLECTOMY      age 18    US BREAST BIOPSY 1 SITE LEFT (CPT=19083)      [facility]      Family History   Problem Relation Age of Onset    Breast Cancer Self 52    Breast Cancer Mother 48      Social History:   Social History     Socioeconomic History    Marital status: Single   Tobacco Use    Smoking status: Never    Smokeless tobacco: Never   Vaping Use    Vaping Use: Never used   Substance and Sexual Activity    Alcohol use: Not Currently     Alcohol/week: 11.7 standard  drinks of alcohol     Types: 14 Glasses of wine per week    Drug use: No           REVIEW OF SYSTEMS:   GENERAL: fatigued but otherwise feeling well.  SKIN: dry pruritic skin posterior left ear  EYES:denies blurred vision or double vision  HEENT: denies nasal congestion, sinus pain or ST  LUNGS: denies shortness of breath with exertion  CARDIOVASCULAR: denies chest pain on exertion  GI: denies abdominal pain,denies heartburn  MUSCULOSKELETAL: denies back pain  NEURO: denies headaches  HEMATOLOGIC: denies hx of anemia  ENDOCRINE: no history of hyper- or hypo- thyroidism    EXAM:   /72   Pulse 69   Temp 97.3 °F (36.3 °C) (Temporal)   Resp 18   Ht 5' 6\" (1.676 m)   Wt 197 lb 6.4 oz (89.5 kg)   SpO2 96%   BMI 31.86 kg/m²   Constitutional: Oriented to person, place, and time. No distress.   HEENT:  Normocephalic and atraumatic.TM's wnl. Oropharynx is clear and moist.   Eyes: Conjunctivae wnl.  Extraocular movements are intact  Neck: Full ROM.  Neck supple.  No thyromegaly  Cardiovascular: Normal rate, regular rhythm and intact distal pulses.  No murmur, rubs or gallops.   Pulmonary/Chest: Effort normal and breath sounds normal. No respiratory distress.  Abdominal: Soft. Bowel sounds are normal. Non tender, no masses, no organomegaly or hernias.  Musculoskeletal: No edema in bilateral lower extremities.  Strength is 5/5 in bilateral upper and lower extremities.  Lymphadenopathy: No cervical adenopathy.   Neurological: No focal neurologic deficits.  Cranial nerves II through XII are intact. .  Skin: Skin is warm and dry. Posterior to left ear is mild erythema w/o purulence   Psychiatric: Normal mood and affect.     ASSESSMENT AND PLAN:   The patient has a planned right chest wall resection with possible right axillary sentinel lymph node biopsy with Dr. Aliza Hanson at [facility] on 1/9/2024. She has no history of cardiopulmonary disease. She is able to achieve greater than 4 METS. RCRI Class I  Risk. The patient is at acceptable risk for surgical procedure.      ICD-10-CM    1. Chronic cough  R05.3 Pulmonary Referral - In Network     Complete PFT      2. Pre-op evaluation  Z01.818       3. History of bilateral breast cancer  Z85.3       4. Dermatitis  L30.9       5. Malignant neoplasm of right female breast, unspecified estrogen receptor status, unspecified site of breast (HCC)  C50.911               Patient understands to hold all ASA/NSAiD's/vitamins/supplements for one week prior to surgery.    Follow up after surgery as needed. Rest of management per [facility].    Michelle Torres DO

## 2024-01-08 ENCOUNTER — TELEPHONE (OUTPATIENT)
Dept: INTERNAL MEDICINE CLINIC | Facility: CLINIC | Age: 64
End: 2024-01-08

## 2024-01-08 NOTE — TELEPHONE ENCOUNTER
Pre-op clearance completed and faxed to [facility] at [phone], conformation received placed in red folder.

## 2024-01-25 ENCOUNTER — TELEPHONE (OUTPATIENT)
Dept: INTERNAL MEDICINE CLINIC | Facility: CLINIC | Age: 64
End: 2024-01-25

## 2024-01-25 NOTE — TELEPHONE ENCOUNTER
Principal is requesting medical records for this pt  Principal [address] [facility] [address]

## 2024-01-26 ENCOUNTER — TELEPHONE (OUTPATIENT)
Dept: INTERNAL MEDICINE CLINIC | Facility: CLINIC | Age: 64
End: 2024-01-26

## 2024-01-26 DIAGNOSIS — Z17.0 ESTROGEN RECEPTOR POSITIVE STATUS (ER+): ICD-10-CM

## 2024-01-26 DIAGNOSIS — C50.919 MALIGNANT NEOPLASM OF FEMALE BREAST, UNSPECIFIED ESTROGEN RECEPTOR STATUS, UNSPECIFIED LATERALITY, UNSPECIFIED SITE OF BREAST (HCC): Primary | ICD-10-CM

## 2024-01-26 NOTE — TELEPHONE ENCOUNTER
Referral request for Dr. Shannen Hines (heme onc with [facility]), pt scheduled 1/30/24. Dx codes C50.919, Z17.0. Referral pended with all info.

## 2024-04-20 ENCOUNTER — MED REC SCAN ONLY (OUTPATIENT)
Dept: INTERNAL MEDICINE CLINIC | Facility: CLINIC | Age: 64
End: 2024-04-20

## 2024-10-21 ENCOUNTER — HOSPITAL ENCOUNTER (OUTPATIENT)
Dept: CT IMAGING | Age: 64
Discharge: HOME | End: 2024-10-21
Attending: STUDENT IN AN ORGANIZED HEALTH CARE EDUCATION/TRAINING PROGRAM
Payer: COMMERCIAL

## 2024-10-21 ENCOUNTER — TELEPHONE (OUTPATIENT)
Facility: CLINIC | Age: 64
End: 2024-10-21

## 2024-10-21 DIAGNOSIS — E27.9 ADRENAL NODULE (HCC): ICD-10-CM

## 2024-10-21 PROCEDURE — 74150 CT ABDOMEN W/O CONTRAST: CPT | Performed by: STUDENT IN AN ORGANIZED HEALTH CARE EDUCATION/TRAINING PROGRAM

## 2024-10-21 NOTE — TELEPHONE ENCOUNTER
Recevied call from CT needing verbal on whether or not wash out is needed for Adrenal CT    RN spoke with Dr. Rodríguez and received verbal stating patient did not need a wash out.    We are just looking for size.    Closing Encounter.

## 2024-10-24 ENCOUNTER — OFFICE VISIT (OUTPATIENT)
Facility: CLINIC | Age: 64
End: 2024-10-24
Payer: COMMERCIAL

## 2024-10-24 VITALS
DIASTOLIC BLOOD PRESSURE: 72 MMHG | HEART RATE: 85 BPM | OXYGEN SATURATION: 94 % | SYSTOLIC BLOOD PRESSURE: 130 MMHG | BODY MASS INDEX: 32 KG/M2 | HEIGHT: 66 IN

## 2024-10-24 DIAGNOSIS — E27.9 ADRENAL NODULE (HCC): Primary | ICD-10-CM

## 2024-10-24 PROCEDURE — 3078F DIAST BP <80 MM HG: CPT | Performed by: STUDENT IN AN ORGANIZED HEALTH CARE EDUCATION/TRAINING PROGRAM

## 2024-10-24 PROCEDURE — 3075F SYST BP GE 130 - 139MM HG: CPT | Performed by: STUDENT IN AN ORGANIZED HEALTH CARE EDUCATION/TRAINING PROGRAM

## 2024-10-24 PROCEDURE — 99214 OFFICE O/P EST MOD 30 MIN: CPT | Performed by: STUDENT IN AN ORGANIZED HEALTH CARE EDUCATION/TRAINING PROGRAM

## 2024-10-24 RX ORDER — DEXAMETHASONE 1 MG
1 TABLET ORAL ONCE
Qty: 1 TABLET | Refills: 0 | Status: SHIPPED | OUTPATIENT
Start: 2024-10-24 | End: 2024-10-24

## 2024-10-24 NOTE — PATIENT INSTRUCTIONS
Visit Summary  Take 1mg Dexamethasone tab at 11pm and complete your testing the following morning at 8am for cortisol level  I will be in touch once your labs result     General follow up information:  Please let us know if you require any refills at least 1 week prior to your medication running out. If you do run out of medication, please call our office ASAP to request refills (do not wait until your follow up).  Please call us if you experience any problems with insurance coverage of medication, lab work, or imaging.   The on-call pager is for urgent matters only. If you are a type 1 diabetic and run out of insulin after business hours 8AM-4PM, you may call the on-call pager for a refill to a 24 hour pharmacy. If you have adrenal insufficiency and run out of steroids, you may call the on-call pager for a refill to a 24 hours pharmacy. All other refill requests should be requested during business hours.    Return Visit   [x] Dr. Rodríguez in 12 months   [] Virtual visit  [] No follow up appointment needed  [] Follow up to be scheduled pending      [x]  Fasting/8AM labs  []  Central scheduling # for ultrasound/nuclear med/CT/MRI/DXA/IR  []  Provide flyer for:  [] ENT   [] Weight Management  [] Infertility/Reproductive Endocrinology  [] Transgender care  []  Directions to 1st floor lab  [] Collect urine collection jug  [] Collect salivary cortisol tubes  []  Dental clearance form  []  Will need authorization for outside records

## 2024-10-24 NOTE — PROGRESS NOTES
ENDOCRINOLOGY, DIABETES, & METABOLISM NOTE   Name:Victoria Eric  : 3/15/1960  MRN: [id number]  Initial Consult Date: 2023  Today's date: 23     Subjective:   Victoria Eric is a 64 year old female who presents for consultation regarding her left adrenal nodule.    Initial Consult 2023  Patient was initially noted to have an adrenal nodule measuring 1.8x1.6 cm on the left adrenal based on CT imaging done on 3/16/2023 for abdominal pain ISO hx of breast CA.  Read as indeterminate with recommendation to obtain PET or CT Adrenal with washout protocol.  PET scan 3/31/2023 revealed a 1.5cm left adrenal adenoma with pre contrast HU of 7. 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 Tamoxifen and anastrozole therapies were not pursued secondary to poor tolerance/adverse effects.  In the staging process, mediastinal lymphadenopathy was noted evaluated by bronchoscopy and EBUS revealing a fungal etiology (yeast). She stated she was told to start high dose fluconazole x3 months, however, she decided to defer for a second opinion.    10/2021 CT A/P showed a 2cm left adrenal nodule unchanged in appearance from previous exam. Unclear of when previous exam was. Denies having biochemical testing at that time.   [facility]  Since then, she relocated to [place] from [place] around 10/2022.  Symptoms of Cushing's syndrome: around a 15 pound wt gain over the last 6 months, had purple stretch marks on breast but none on abdomen, easy bruising on abdomen, depressed mood   Denies impaired glucose tolerance or DM, denies abdominal purple striae, moon facies, proximal muscle weakness, skin thinning or irregular  menses  She denies hx of acute onset hirsutism.   She does have family hx of DM  Symptoms to suggest a Pheochromocytoma: Endorses intermittent palpations and notes diaphoresis and pallor when flying  Denies headache or worsening chest pressure  Patient does not have a hx of HTN, she does endorse ocassional high BP but has not been treated for this. Patient has never had problems with hypokalemia. 12/2021 Potassium 3.7   Patient’s family history is unremarkable for adrenal problems, CVA's or uncontrolled HTN.  Patient has no history of HIV. She does have a hx of exposure to TB in her 20s but denies ever being diagnosed with it or treated for it.   She does have a hx of fungal illness (hilar LN's. Path was negative for malignancy however did show fungal organisms in yeast form).   Patient has a history of malignancy with breast cancer diagnosed 8/2012.    11/2023  Since last visit, she is doing well  Her secondary blood work was unremarkable   She underwent CT Adrenal  which showed her left adrenal 1.5 x 1.8 cm nodule demonstrates a pre contrast Hounsfield unit of 9.23.  Absolute washout equals 62.6% and relative washout equals 52.6%.      She is also establishing care with an oncologist regarding her hx of breast cancer      10/24/2024  6/2024 creatinine 0.8, GFR greater than 60   10/2024 CT A/P: 2.3 x 1.7 cm lipid rich left adrenal adenoma  Since last visit, has been following with oncology regarding her breast cancer. Will be completing a NM Bone scan and DEXA  Started a new supplement and feels great     History/Other:    Chief Complaint Reviewed and Verified  Nursing Notes Reviewed and   Verified  Tobacco Reviewed  Allergies Reviewed  Medications Reviewed    Problem List Reviewed  Medical History Reviewed  Surgical History   Reviewed  Family History Reviewed         Tobacco:  She has never smoked tobacco.    Current Outpatient Medications   Medication Sig Dispense Refill    QUERCETIN OR Take 800 mg by  mouth daily. Quercetin w/ Bromelain 165mg      dexamethasone 1 MG Oral Tab Take 1 tablet (1 mg total) by mouth one time for 1 dose. Take pill at 11pm. Get fasting blood test the next morning 8-9am. 1 tablet 0    albuterol 108 (90 Base) MCG/ACT Inhalation Aero Soln Inhale 1-2 puffs into the lungs every 6 (six) hours as needed for Wheezing.      exemestane 25 MG Oral Tab Take 1 tablet (25 mg total) by mouth daily.      Multiple Vitamin Oral Tab Take 1 tablet by mouth daily.       Allergies   Allergen Reactions    Dairy Products OTHER (SEE COMMENTS)     pimples    Sulfa Antibiotics      Past Medical History:    Anxiety    Breast cancer (HCC)    left breast cancer-LCIS      Past Surgical History:   Procedure Laterality Date    Appendectomy      age 10    Breast biopsy  Aug. 2012    left and right breast          birth of twins    Lumpectomy left  Aug. 2012    with lymph node biopsy    Mri breast biopsy w/ clip 1 site (cpt=19085)      rt breast-benign    Other surgical history      repair of vaginal/rectal tear with childbirth    Radiation left      Removal of tonsils,13+ y/o      Tonsillectomy      age 18    Us breast biopsy 1 site left (cpt=19083)  '    [facility]     Social History     Socioeconomic History    Marital status: Single   Tobacco Use    Smoking status: Never    Smokeless tobacco: Never   Vaping Use    Vaping status: Never Used   Substance and Sexual Activity    Alcohol use: Not Currently     Alcohol/week: 11.7 standard drinks of alcohol     Types: 14 Glasses of wine per week    Drug use: No     Social Drivers of Health     Food Insecurity: Low Risk  (2024)    [facility]    Food Insecurity     Have there been times that your food ran out, and you didn't have money to get more?: No     Are there times that you worry that this might happen?: No   Transportation Needs: Low Risk  (2024)    Received from [facility]  [facility]    Transportation Needs     Do you have trouble getting transportation to medical appointments?: No    [facility], [facility]    Social Connections   Housing Stability: Low Risk  (1/9/2024)    Received from [facility]    Housing Stability     Are you concerned about having a safe and reliable place to live?: No     Family History   Problem Relation Age of Onset    Breast Cancer Self 52    Breast Cancer Mother 48         Review of Systems:  12 point ROS completed, refer to HPI for pertinent positives    Objective:   /72   Pulse 85   Ht 5' 6\" (1.676 m)   SpO2 94%   BMI 31.86 kg/m²  Estimated body mass index is 31.86 kg/m² as calculated from the following:    Height as of this encounter: 5' 6\" (1.676 m).    Weight as of 1/5/24: 197 lb 6.4 oz (89.5 kg).  Physical Exam  Constitutional:       General: She is not in acute distress.     Appearance: Normal appearance. She is normal weight. She is not ill-appearing or diaphoretic.      Comments: Truncal obesity Yes:    HENT:      Head: Normocephalic and atraumatic.      Comments: Weinstein facies No Voice change No     Mouth/Throat:      Mouth: Mucous membranes are dry.   Eyes:      General: No scleral icterus.     Extraocular Movements: Extraocular movements intact.      Conjunctiva/sclera: Conjunctivae normal.      Pupils: Pupils are equal, round, and reactive to light.   Neck:      Thyroid: No thyroid mass, thyromegaly or thyroid tenderness.      Comments: [place] hump No, Supraclavicular fat pad No  Cardiovascular:      Rate and Rhythm: Normal rate and regular rhythm.      Heart sounds: No murmur heard.  Pulmonary:      Effort: Pulmonary effort is normal. No respiratory distress.      Breath sounds: Normal breath sounds. No wheezing.   Abdominal:      General: Bowel sounds are normal. There is no  distension.      Palpations: Abdomen is soft.      Tenderness: There is no abdominal tenderness.   Musculoskeletal:         General: No swelling or tenderness.   Skin:     Coloration: Skin is not jaundiced or pale.      Findings: No bruising, erythema or rash.      Comments: Abdominal striae No, some skin colored stretch marks   Bruising No  Hirsutism No  Acne No  Neurological:      General: No focal deficit present.      Mental Status: She is alert and oriented to person, place, and time.      Sensory: No sensory deficit.   Psychiatric:         Mood and Affect: Mood normal.         Behavior: Behavior normal.       Laboratory Data   Recent Labs: Labs reviewed in [facility] under lab tab on 9/28/2023. Interpretation: secondary work up is unremarkable     Component      Latest Ref Rng 9/30/2023 10/1/2023   Glucose      70 - 99 mg/dL 113 (H)     Sodium      136 - 145 mmol/L 145     Potassium      3.5 - 5.1 mmol/L 4.0     Chloride      98 - 112 mmol/L 114 (H)     Carbon Dioxide, Total      21.0 - 32.0 mmol/L 25.0     ANION GAP      0 - 18 mmol/L 6     BUN      7 - 18 mg/dL 12     CREATININE      0.55 - 1.02 mg/dL 0.66     BUN/CREATININE RATIO      10.0 - 20.0  18.2     CALCIUM      8.5 - 10.1 mg/dL 8.8     CALCULATED OSMOLALITY      275 - 295 mOsm/kg 301 (H)     EGFR      >=60 mL/min/1.73m2 99     Patient Fasting for BMP? Yes     HEMOGLOBIN A1c      <5.7 % 5.1     ESTIMATED AVERAGE GLUCOSE      68 - 126 mg/dL 100     T4,Free (Direct)      0.8 - 1.7 ng/dL 0.8     TSH      0.358 - 3.740 mIU/mL 2.450     NORMETANEPHRINE      0.0 - 285.2 pg/mL 48.7     METANEPHRINE      0.0 - 88.0 pg/mL 33.1     DHEA SULFATE      25.9 - 460.2 ug/dL 39.1     Cortisol      ug/dL  1.1    RENIN ACTIVITY      0.167 - 5.380 ng/mL/hr 0.367     DEXAMETHASONE, SERUM      ng/dL <30     ALDOSTERONE      0.0 - 30.0 ng/dL 4.7     VITAMIN D, 25-OH, TOTAL      30.0 - 100.0 ng/mL 25.5 (L)     ACTH      7.2 - 63.3 pg/mL  1.8 (L)         1/2022 4L LN FNA with  necrotizing granulomatous inflammation  Comment    Granulomatous inflammation with necrosis is seen in part A.  Therefore, special stains for acid-fast bacteria (AFB) and fungal organisms (GMS and ABP-F) were obtained.  The AFB and ABP-F stains are negative.  The GMS stain, however, shows yeast-like forms.  The differential diagnosis includes Pneumocystis jirovecii, Cryptococcus, and other yeast-forming fungi.       Imaging   Radiology: Personally reviewed on 9/28/2023  I reviewed the patient's records from outside facility: 1.5cm nodule. Previous OSH CT     10/2021 CT A/P with IV Con  There is a relatively low density 2 cm nodule in the left adrenal gland which is essentially unchanged in appearance in   comparison to the previous study. There appears to be some relatively well-circumscribed fat within the lesion along its lateral margin anteriorly. Right adrenal is unremarkable.     3/16/2023 CT C/A/P  4. Indeterminate left adrenal nodule.  If the patient undergoes PET-CT for assessment of the above described lung nodule, this adrenal nodule can be assessed with PET-CT.  If no PET-CT is performed, recommend adrenal protocol CT or MRI for further   assessment.        3/31/2023 PET  PROCEDURE:  PET/CT STANDARD BODY SCAN (ONCOLOGY) (CPT=78815)  Abdomen:   5.8 cm cyst at the dome of the liver with no surrounding increased FDG metabolism.  1.5 cm left adrenal adenoma.  Precontrast Hounsfield units measure 7. This demonstrates mild increased FDG metabolism with SUV of 2.8.  Colonic diverticulosis.   1.5 cm left adrenal adenoma aunt demonstrates mild increased FDG metabolism.     11/24/2023 CT Abdomen W+Wo  ADRENALS:  Left adrenal 1.5 x 1.8 cm nodule demonstrates a pre contrast Hounsfield unit of 9.23.  Absolute washout equals 62.6% and relative washout equals 52.6%.  Findings are highly suggestive of adrenal adenoma.       ASSESSMENT/PLAN   Assessment & Plan:     Victoria Eric is a 64 year old female who presented to  clinic for:  1. Adrenal nodule (HCC) (Primary)  -     dexAMETHasone; Take 1 tablet (1 mg total) by mouth one time for 1 dose. Take pill at 11pm. Get fasting blood test the next morning 8-9am.  Dispense: 1 tablet; Refill: 0    -- counseled patient on the prevalence of adrenal incidentalomas (up to 10% in the general population, especially in older patients), and the purpose of workup to rule out subclinical hormonal hyperfunction as well as malignancy  -- Patient does not have a documented history of HTN but does endorse SBP of up to 170 at home. She denies hypokalemia. Secondary work up negative for elevated PRA ratio  -- Patient has no signs of Cushings on exam but she does endorse symptoms of weight gain and bruising. DST negative 10/2023  -- CT Abdomen/Pelvis reports mass with stable size since 2021.   -- PET notes left adrenal nodule of 1.5 cm with pre-contrast HU of 7  --11/2023 CT Abdomen with greater than 60% absolute washout and greater than 40% relative washout, suggestive of adrenal adenoma  --10/2024 CT Abdomen with 2.3 x 1.7 cm lipid rich left adrenal adenoma  PLAN  -- Dexamethasone 1mg suppression test (DST, pt needs to take 1mg Dexamethasone tab at 11pm, and needs to  return the next morning at 8am for cortisol level)   -- counseled on lab workup for above    --Will discuss next steps once labs result   --Will follow with oncology regarding DEXA   -- Discussed that adrenal adenoma is stable in size and could follow hormonal testing. We decided on repeat CT A + P prior to follow up visit to complete 4 years of surveillance       Return in about 1 year (around 10/24/2025).    The above plan was discussed in detail with the patient who verbalized understanding and agreement.      A total of 30 minutes was spent today on obtaining history, reviewing pertinent labs, reviewing relevant pathophysiology with patient, reviewing outside records, evaluating patient, providing multiple treatment options,  completing documentation and orders, and communicating with other providers as appropriate.     Melissa Rodríguez DO  [facility] Endocrinology  10/24/2024     Note to patient: The 21 Century Cures Act makes medical notes like these available to patients in the interest of transparency. However, be advised this is a medical document. It is intended as peer to peer communication. It is written in medical language and may contain abbreviations or verbiage that are unfamiliar. It may appear blunt or direct. Medical documents are intended to carry relevant information, facts as evident, and the clinical opinion of the practitioner.

## 2024-11-02 ENCOUNTER — LAB ENCOUNTER (OUTPATIENT)
Dept: LAB | Age: 64
End: 2024-11-02
Attending: INTERNAL MEDICINE
Payer: COMMERCIAL

## 2024-11-02 DIAGNOSIS — E27.9 ADRENAL NODULE (HCC): ICD-10-CM

## 2024-11-02 LAB — CORTIS SERPL-MCNC: 0.9 UG/DL

## 2024-11-02 PROCEDURE — 82533 TOTAL CORTISOL: CPT

## 2024-11-02 PROCEDURE — 80299 QUANTITATIVE ASSAY DRUG: CPT

## 2024-11-02 PROCEDURE — 36415 COLL VENOUS BLD VENIPUNCTURE: CPT

## 2024-11-02 PROCEDURE — 82024 ASSAY OF ACTH: CPT

## 2024-11-05 LAB — ACTH: <1.5 PG/ML

## 2024-11-16 LAB — DEXAMETHASONE, SERUM: 395 NG/DL

## 2025-04-07 ENCOUNTER — OFFICE VISIT (OUTPATIENT)
Dept: FAMILY MEDICINE CLINIC | Facility: CLINIC | Age: 65
End: 2025-04-07
Payer: COMMERCIAL

## 2025-04-07 VITALS
WEIGHT: 195 LBS | SYSTOLIC BLOOD PRESSURE: 132 MMHG | DIASTOLIC BLOOD PRESSURE: 86 MMHG | OXYGEN SATURATION: 97 % | HEART RATE: 99 BPM | RESPIRATION RATE: 20 BRPM | TEMPERATURE: 98 F | HEIGHT: 66 IN | BODY MASS INDEX: 31.34 KG/M2

## 2025-04-07 DIAGNOSIS — J40 SINOBRONCHITIS: Primary | ICD-10-CM

## 2025-04-07 DIAGNOSIS — J32.9 SINOBRONCHITIS: Primary | ICD-10-CM

## 2025-04-07 PROCEDURE — 3008F BODY MASS INDEX DOCD: CPT | Performed by: PHYSICIAN ASSISTANT

## 2025-04-07 PROCEDURE — 3075F SYST BP GE 130 - 139MM HG: CPT | Performed by: PHYSICIAN ASSISTANT

## 2025-04-07 PROCEDURE — 99213 OFFICE O/P EST LOW 20 MIN: CPT | Performed by: PHYSICIAN ASSISTANT

## 2025-04-07 PROCEDURE — 3079F DIAST BP 80-89 MM HG: CPT | Performed by: PHYSICIAN ASSISTANT

## 2025-04-07 RX ORDER — DOXYCYCLINE HYCLATE 100 MG
100 TABLET ORAL 2 TIMES DAILY
Qty: 20 TABLET | Refills: 0 | Status: SHIPPED | OUTPATIENT
Start: 2025-04-07

## 2025-04-07 RX ORDER — ALBUTEROL SULFATE 90 UG/1
2 INHALANT RESPIRATORY (INHALATION) EVERY 6 HOURS PRN
Qty: 1 EACH | Refills: 0 | Status: SHIPPED | OUTPATIENT
Start: 2025-04-07

## 2025-04-07 RX ORDER — PROPRANOLOL HYDROCHLORIDE 10 MG/1
10 TABLET ORAL 3 TIMES DAILY
COMMUNITY

## 2025-04-07 RX ORDER — PREDNISONE 20 MG/1
TABLET ORAL
Qty: 12 TABLET | Refills: 0 | Status: SHIPPED | OUTPATIENT
Start: 2025-04-07

## 2025-04-07 RX ORDER — CLOTRIMAZOLE 10 MG/1
10 LOZENGE ORAL
Qty: 35 LOZENGE | Refills: 0 | Status: SHIPPED | OUTPATIENT
Start: 2025-04-07 | End: 2025-04-14

## 2025-04-07 RX ORDER — BENZONATATE 200 MG/1
200 CAPSULE ORAL 3 TIMES DAILY PRN
Qty: 30 CAPSULE | Refills: 0 | Status: SHIPPED | OUTPATIENT
Start: 2025-04-07

## 2025-04-07 NOTE — PROGRESS NOTES
CHIEF COMPLAINT:     Chief Complaint   Patient presents with    Cough     Cough with difficulty breathing due to being sick. Headache, previous fever of 100.4°-now gone, fatigue, nausea, head congestion. - Entered by patient  Started a week ago   Fri-Sat         HPI:   Victoria Eric is a 65 year old female who presents with cough and congestion for 1 week.  She reports flu like symptoms at onset with cough, congestion, myalgia, fatigue.  Seen in [facility] clinic and reported she was flu and covid negative. She has an asthmatic history and doing home duonebs which seem to help.  She reports did have some low grad fever 100.4 over the weekend but fever and bodyaches have resolved.  She still feels fatigued.  She continues to have some headache and sinus symptoms too.  She feels wheezy and tighter in the chest at times and can hear it more when laying down.  No chest pain.      Currently Associated symptoms:    Fever/Chills  No  Sore throat  No  Cough  Yes   Congestion Yes  Bodyache  No  Headache  Yes  Chest pain No  SOB/Dyspnea Yes feels tight in chest  Loss of taste No  Loss of smell No  Diarrhea No  Vomiting No    No flu shot this season.  Covid vaccinations x 2    No known flu, rsv, covid exposures    Remote history of pneumonia.       Current Outpatient Medications   Medication Sig Dispense Refill    propranolol 10 MG Oral Tab Take 1 tablet (10 mg total) by mouth 3 (three) times daily. Takes 2 PO bid.  For essential tremor.      Ipratropium-Albuterol (DUONEB IN) Inhale into the lungs.      predniSONE 20 MG Oral Tab 2 PO once daily for 5 days, 1 PO daily for 2 days. 12 tablet 0    albuterol 108 (90 Base) MCG/ACT Inhalation Aero Soln Inhale 2 puffs into the lungs every 6 (six) hours as needed for Wheezing. 1 each 0    Doxycycline Hyclate 100 MG Oral Tab Take 1 tablet (100 mg total) by mouth 2 (two) times daily. 20 tablet 0    benzonatate 200 MG Oral Cap Take 1 capsule (200 mg total) by mouth 3 (three) times daily  as needed for cough. 30 capsule 0    clotrimazole 10 MG Mouth/Throat Lennie Take 1 lozenge (10 mg total) by mouth 5 (five) times daily for 7 days. 35 lozenge 0    exemestane 25 MG Oral Tab Take 1 tablet (25 mg total) by mouth daily.        Past Medical History:    Anxiety    Breast cancer (HCC)    left breast cancer-LCIS      Social History:  Social History     Socioeconomic History    Marital status: Single   Tobacco Use    Smoking status: Never    Smokeless tobacco: Never   Vaping Use    Vaping status: Never Used   Substance and Sexual Activity    Alcohol use: Not Currently     Alcohol/week: 11.7 standard drinks of alcohol     Types: 14 Glasses of wine per week    Drug use: No     Social Drivers of Health     Food Insecurity: Low Risk  (3/5/2025)    Received from [facility]    Food Insecurity     Have there been times that your food ran out, and you didn't have money to get more?: No     Are there times that you worry that this might happen?: No   Transportation Needs: Low Risk  (3/5/2025)    Received from [facility]    Transportation Needs     Do you have trouble getting transportation to medical appointments?: No     How do you normally get to and from your appointments?: Other        Review of Systems:    Positive for stated complaint: URI symptoms.   Pertinent positives and negatives noted in the the HPI.    EXAM:   /86   Pulse 99   Temp 97.5 °F (36.4 °C)   Resp 20   Ht 5' 6\" (1.676 m)   Wt 195 lb (88.5 kg)   SpO2 97%   BMI 31.47 kg/m²   GENERAL: well developed, well nourished,in no apparent distress  SKIN: no rashes,no suspicious lesions  HEAD: atraumatic, normocephalic  EYES: conjunctiva clear, sclera white,  PERRLA  EARS: TM's non erythematous  NOSE: nares patent, mucosa mild congestion  THROAT: Posterior pharynx is non erythematous, small pnd  NECK: supple, non-tender  LUNGS: clear to auscultation bilaterally without rale, ronchi, wheeze.  CARDIO:  S1/S2 without murmur  EXTREMITIES: no cyanosis, clubbing or edema  LYMPH:  no gross lymphadenopathy.      No results found for this or any previous visit (from the past 24 hours).      ASSESSMENT AND PLAN:   Victoria Eric is a 65 year old female who presents with Cough (Cough with difficulty breathing due to being sick. Headache, previous fever of 100.4°-now gone, fatigue, nausea, head congestion. - Entered by patient/Started a week ago /Fri-Sat  ). Symptoms are consistent with:      ASSESSMENT:  Encounter Diagnosis   Name Primary?    Sinobronchitis Yes       PLAN:     Symptomatic care:   1. Rest. Drink plenty of fluids.  2. Tylenol or ibuprofen for discomfort or fever.   3. OTC decongestant (phenylephrine) expectorants (guaifenesin), nasal steroid sprays  (fluticasone) may be helpful        for congestion.  4. OTC cough suppressant for cough  (dextromethorphan)  5. Chloraseptic spray/throat lozenges for sore throat   6 tessalon as written  7 doxycycline as written  8 prednisone as written  9 albuterol as written.    She requests mycelex troches for thrush she repots she get with antibiotics and inhaler usage which I provided too.    She should follow up with her PCP or higher level of care if symptoms not steadily improving.      Go to the ED for evaluation with progressive symptoms of difficulty swallowing, breathing, shortness of breath, chest pain, extreme weakness, or confusion.         Meds & Refills for this Visit:  Requested Prescriptions     Signed Prescriptions Disp Refills    predniSONE 20 MG Oral Tab 12 tablet 0     Si PO once daily for 5 days, 1 PO daily for 2 days.    albuterol 108 (90 Base) MCG/ACT Inhalation Aero Soln 1 each 0     Sig: Inhale 2 puffs into the lungs every 6 (six) hours as needed for Wheezing.    Doxycycline Hyclate 100 MG Oral Tab 20 tablet 0     Sig: Take 1 tablet (100 mg total) by mouth 2 (two) times daily.    benzonatate 200 MG Oral Cap 30 capsule 0     Sig: Take 1 capsule  (200 mg total) by mouth 3 (three) times daily as needed for cough.    clotrimazole 10 MG Mouth/Throat Lennie 35 lozenge 0     Sig: Take 1 lozenge (10 mg total) by mouth 5 (five) times daily for 7 days.       Risks, benefits, side effects of medication addressed and explained.    There are no Patient Instructions on file for this visit.    The patient indicates understanding of these issues and agrees to the plan.  The patient is asked to follow up PCP

## 2025-05-14 ENCOUNTER — HOSPITAL ENCOUNTER (OUTPATIENT)
Dept: GENERAL RADIOLOGY | Age: 65
Discharge: HOME | End: 2025-05-14
Payer: COMMERCIAL

## 2025-05-14 ENCOUNTER — OFFICE VISIT (OUTPATIENT)
Dept: INTERNAL MEDICINE CLINIC | Facility: CLINIC | Age: 65
End: 2025-05-14
Payer: COMMERCIAL

## 2025-05-14 VITALS
SYSTOLIC BLOOD PRESSURE: 125 MMHG | HEIGHT: 66 IN | DIASTOLIC BLOOD PRESSURE: 79 MMHG | HEART RATE: 84 BPM | RESPIRATION RATE: 18 BRPM | BODY MASS INDEX: 32.14 KG/M2 | OXYGEN SATURATION: 94 % | TEMPERATURE: 98 F | WEIGHT: 200 LBS

## 2025-05-14 DIAGNOSIS — R07.89 CHEST TIGHTNESS: ICD-10-CM

## 2025-05-14 DIAGNOSIS — Z12.11 COLON CANCER SCREENING: ICD-10-CM

## 2025-05-14 DIAGNOSIS — R05.2 SUBACUTE COUGH: Primary | ICD-10-CM

## 2025-05-14 DIAGNOSIS — R05.2 SUBACUTE COUGH: ICD-10-CM

## 2025-05-14 PROCEDURE — 99214 OFFICE O/P EST MOD 30 MIN: CPT

## 2025-05-14 PROCEDURE — 3074F SYST BP LT 130 MM HG: CPT

## 2025-05-14 PROCEDURE — 3078F DIAST BP <80 MM HG: CPT

## 2025-05-14 PROCEDURE — 71046 X-RAY EXAM CHEST 2 VIEWS: CPT

## 2025-05-14 PROCEDURE — 3008F BODY MASS INDEX DOCD: CPT

## 2025-05-14 RX ORDER — FLUTICASONE PROPIONATE AND SALMETEROL XINAFOATE 115; 21 UG/1; UG/1
1 AEROSOL, METERED RESPIRATORY (INHALATION) 2 TIMES DAILY
Qty: 1 EACH | Refills: 0 | Status: SHIPPED | OUTPATIENT
Start: 2025-05-14

## 2025-05-14 RX ORDER — PREDNISONE 20 MG/1
20 TABLET ORAL DAILY
Qty: 5 TABLET | Refills: 0 | Status: SHIPPED | OUTPATIENT
Start: 2025-05-14 | End: 2025-05-19

## 2025-05-14 RX ORDER — ONDANSETRON 4 MG/1
TABLET, FILM COATED ORAL
COMMUNITY
Start: 2024-06-06

## 2025-05-14 NOTE — PROGRESS NOTES
Victoria Eric is a 65 year old female.   Chief Complaint   Patient presents with    Urgent Care F/u     Rm 14 SS  URI since the beginning of April.      HPI:      History of Present Illness  Victoria Eric is a 65 year old female who presents with persistent respiratory symptoms following multiple treatments for sinus and respiratory infections.    Her symptoms began at the end of March or beginning of April. Initially, she was treated at a walk-in clinic with doxycycline, prednisone (40 mg daily for five days with a one-day taper), benzonatate, and nebulizer breathing treatments. She experienced improvement for about a week to ten days before her symptoms returned.    In early May, she developed a sinus infection and otitis media. On May 1, she was prescribed amoxicillin, prednisone (40 mg daily for five days), benzonatate, ipratropium, and albuterol for nebulizer use at a [facility] clinic. Despite these treatments, her symptoms have persisted for over a month, and she describes feeling 'terrible'.    She experiences tightness in the chest, difficulty breathing, and loss of voice, which has impacted her work. She also had a low-grade fever of 99.8°F around May 1, which resolved after a couple of days. No recent fever, diarrhea, or new symptoms.    She uses albuterol via nebulizer as needed but finds it difficult to use at work. The albuterol inhaler does not provide relief, and she relies on the nebulizer for symptom management. She has six tablets remaining of her current Augmentin prescription, which was a ten-day course starting on May 7.    She has a history of respiratory issues, noting that when she had COVID in August 2022, she experienced prolonged respiratory symptoms requiring nebulizer use.    Allergies:  Allergies[1]   Current Meds:  Current Medications[2]     PMH:   Past Medical History[3]    ROS:   Review of Systems   Constitutional:  Positive for fatigue. Negative for chills and fever.   HENT:   Positive for postnasal drip and voice change. Negative for congestion and ear pain.    Respiratory:  Positive for cough and chest tightness. Negative for wheezing.    Cardiovascular: Negative.    Gastrointestinal: Negative.    Neurological: Negative.         PHYSICAL EXAM:    /79   Pulse 84   Temp 97.7 °F (36.5 °C) (Temporal)   Resp 18   Ht 5' 6\" (1.676 m)   Wt 200 lb (90.7 kg)   SpO2 94%   BMI 32.28 kg/m²   Physical Exam  Constitutional:       Appearance: Normal appearance.   HENT:      Right Ear: Tympanic membrane normal.      Left Ear: Tympanic membrane normal.      Mouth/Throat:      Mouth: Mucous membranes are moist.      Pharynx: Posterior oropharyngeal erythema present. No oropharyngeal exudate.   Cardiovascular:      Rate and Rhythm: Normal rate.      Pulses: Normal pulses.      Heart sounds: Normal heart sounds.   Pulmonary:      Effort: Pulmonary effort is normal.      Breath sounds: Normal breath sounds.   Skin:     General: Skin is warm and dry.   Neurological:      Mental Status: She is alert.          ASSESSMENT/ PLAN:     Assessment & Plan  Subacute cough  - Ordered chest x-ray   - Continue Augmentin for the remaining course.  - Prescribe prednisone 20 mg for 5 days.  - inhaled steroid use discussed with patient, regine and spit.     Upper respiratory infection  Ongoing upper respiratory infection with chest tightness and aphonia. Previous treatments included doxycycline, Augmentin, and prednisone.   - Ordered chest x-ray   - Prescribe prednisone 20 mg for 5 days.        Health Maintenance Due   Topic Date Due    Annual Physical  Never done    Colorectal Cancer Screening  Never done    Pneumococcal Vaccine: 50+ Years (1 of 2 - PCV) Never done    DTaP,Tdap,and Td Vaccines (1 - Tdap) Never done    Zoster Vaccines (1 of 2) Never done    Pap Smear  Never done    COVID-19 Vaccine (3 - 2024-25 season) 09/01/2024    Annual Depression Screening  01/01/2025            The following individual(s)  verbally consented to be recorded using ambient AI listening technology and understand that they can each withdraw their consent to this listening technology at any point by asking the clinician to turn off or pause the recording:    Patient name: Victoria Eric    Pt indicates understanding and agrees to the plan.     No follow-ups on file.    JESSICA Alonso          [1]   Allergies  Allergen Reactions    Dairy Products OTHER (SEE COMMENTS)     pimples    Sulfa Antibiotics    [2]   Current Outpatient Medications   Medication Sig Dispense Refill    amoxicillin clavulanate 875-125 MG Oral Tab Take 1 tablet by mouth in the morning and 1 tablet before bedtime.      ondansetron (ZOFRAN) 4 mg tablet TAKE ONE TABLET BY MOUTH EVERY 12 (TWELVE) HOURS AS NEEDED FOR NAUSEA (VOMITING) for flying.      predniSONE 20 MG Oral Tab Take 1 tablet (20 mg total) by mouth daily for 5 days. 5 tablet 0    fluticasone-salmeterol 115-21 MCG/ACT Inhalation Aerosol Inhale 1 puff into the lungs 2 (two) times daily. 1 each 0    Ipratropium-Albuterol (DUONEB IN) Inhale into the lungs.      albuterol 108 (90 Base) MCG/ACT Inhalation Aero Soln Inhale 2 puffs into the lungs every 6 (six) hours as needed for Wheezing. 1 each 0    benzonatate 200 MG Oral Cap Take 1 capsule (200 mg total) by mouth 3 (three) times daily as needed for cough. 30 capsule 0    exemestane 25 MG Oral Tab Take 1 tablet (25 mg total) by mouth daily.      propranolol 10 MG Oral Tab Take 1 tablet (10 mg total) by mouth 3 (three) times daily. Takes 2 PO bid.  For essential tremor. (Patient not taking: Reported on 5/14/2025)      predniSONE 20 MG Oral Tab 2 PO once daily for 5 days, 1 PO daily for 2 days. (Patient not taking: Reported on 5/14/2025) 12 tablet 0    Doxycycline Hyclate 100 MG Oral Tab Take 1 tablet (100 mg total) by mouth 2 (two) times daily. (Patient not taking: Reported on 5/14/2025) 20 tablet 0   [3]   Past Medical History:   Anxiety    Breast cancer (HCC)     left breast cancer-LCIS

## 2025-07-10 ENCOUNTER — NURSE TRIAGE (OUTPATIENT)
Dept: INTERNAL MEDICINE CLINIC | Facility: CLINIC | Age: 65
End: 2025-07-10

## 2025-07-10 NOTE — TELEPHONE ENCOUNTER
Action Requested: Summary for Provider     []  Critical Lab, Recommendations Needed  [x] Need Additional Advice  []   FYI    [x]   Need Orders  [] Need Medications Sent to Pharmacy  []  Other     SUMMARY: Dr. Bolivar- Patient requesting oral Diflucan. No appointments. Patient also unable to have vaginal exam due to tearing easily. Please advise if you would order     Reason for call: Vaginal Discharge  Onset: Couple weeks   Reason for Disposition   Symptoms of a yeast infection (i.e., itchy, white discharge, not bad smelling) and not improved > 3 days following Care Advice    Protocols used: Vaginal Symptoms-A-OH    Believes she has a yeast infection   C/o vaginal itching, burning and white/yellow discharge  Denies fever  Not sexually active  Has had yeast infections in the past and feels this is the same  Patient states that she tears easily, is scheduled in November to see specialist at [facility]  Stated last time she had a vaginal exam, she thought she needed stitches due to tearing bad   Stated she is unable to have vaginal exam until she is seen by specialist in November   Requesting oral diflucan. No appointments. Notified for prescriptions we require an appointment. Patient hoping given circumstance, rx can be sent.

## 2025-07-11 NOTE — TELEPHONE ENCOUNTER
Patient states she is unable to do an exam because her skin tears  She has a specialist appointment in November to fix this.   Explained a RX could not be sent in without an appointment to discuss her symptoms.   Accepted appointment for 7/12    Scheduled appointment for 7/12, patient states that a vaginal exam can not be completed due to her skin condition and previous skin tearing

## 2025-07-12 ENCOUNTER — PATIENT MESSAGE (OUTPATIENT)
Dept: INTERNAL MEDICINE CLINIC | Facility: CLINIC | Age: 65
End: 2025-07-12

## 2025-07-12 ENCOUNTER — OFFICE VISIT (OUTPATIENT)
Dept: INTERNAL MEDICINE CLINIC | Facility: CLINIC | Age: 65
End: 2025-07-12
Payer: COMMERCIAL

## 2025-07-12 VITALS
HEART RATE: 62 BPM | TEMPERATURE: 97 F | BODY MASS INDEX: 32.47 KG/M2 | SYSTOLIC BLOOD PRESSURE: 144 MMHG | OXYGEN SATURATION: 98 % | RESPIRATION RATE: 16 BRPM | HEIGHT: 66 IN | WEIGHT: 202 LBS | DIASTOLIC BLOOD PRESSURE: 84 MMHG

## 2025-07-12 DIAGNOSIS — Z12.4 CERVICAL CANCER SCREENING: ICD-10-CM

## 2025-07-12 DIAGNOSIS — Z12.11 COLON CANCER SCREENING: ICD-10-CM

## 2025-07-12 DIAGNOSIS — N89.8 VAGINAL DISCHARGE: Primary | ICD-10-CM

## 2025-07-12 DIAGNOSIS — Z11.3 SCREENING EXAMINATION FOR STD (SEXUALLY TRANSMITTED DISEASE): ICD-10-CM

## 2025-07-12 DIAGNOSIS — Z11.51 SCREENING FOR HPV (HUMAN PAPILLOMAVIRUS): ICD-10-CM

## 2025-07-12 PROCEDURE — 99214 OFFICE O/P EST MOD 30 MIN: CPT

## 2025-07-12 PROCEDURE — 3079F DIAST BP 80-89 MM HG: CPT

## 2025-07-12 PROCEDURE — 3008F BODY MASS INDEX DOCD: CPT

## 2025-07-12 PROCEDURE — 3077F SYST BP >= 140 MM HG: CPT

## 2025-07-12 PROCEDURE — 87624 HPV HI-RISK TYP POOLED RSLT: CPT

## 2025-07-12 PROCEDURE — 81514 NFCT DS BV&VAGINITIS DNA ALG: CPT

## 2025-07-12 PROCEDURE — G2211 COMPLEX E/M VISIT ADD ON: HCPCS

## 2025-07-12 RX ORDER — FLUCONAZOLE 150 MG/1
TABLET ORAL
Qty: 2 TABLET | Refills: 0 | Status: SHIPPED | OUTPATIENT
Start: 2025-07-12

## 2025-07-12 NOTE — PROGRESS NOTES
Victoria rEic is a 65 year old female.   Chief Complaint   Patient presents with    Vaginal Problem     RM 7 SS  Suspects yeast infection a few weeks, patient has lichen sclerosus.      HPI:      History of Present Illness  Victoria Eric is a 65 year old female with lichen sclerosus who with vaginal irritation and discharge changes. She is also due for her pap smear.     She experiences significant tearing and burning in the vaginal area, particularly near the anus, which has been a recurring issue. She describes a severe tear during her last exam five years ago, comparable to a fourth-degree laceration experienced during vaginal delivery.    She has a history of lichen sclerosus and is seeking specialist care for this condition. Although she has an appointment scheduled with a specialist at [facility] on November 20th, she feels unable to wait due to her current symptoms.    She has a history of  yeast infections, particularly after antibiotic use. While she has not had a yeast infection recently, she experiences oral thrush when taking antibiotics and keeps Mysalix lozenges on hand.     She mentions a past experience with trichomonas, attributed to her ex-. Currently not in a relationship, she wants to clear up any potential infections. Her current symptoms include a yellowish discharge and significant itching and burning, which she finds unbearable. The burning sensation is particularly severe and atypical for her.    She has a history of breast cancer, having had three different types of breast cancer three times, and expresses concern about potential issues in the vaginal area. She is scheduled for a colonoscopy in October with Dr. Dominguez  for colon cancer screening.       Allergies:  Allergies[1]   Current Meds:  Current Medications[2]     PMH:   Past Medical History[3]    ROS:   Review of Systems   Constitutional: Negative.    HENT: Negative.     Genitourinary:  Positive for vaginal discharge  and vaginal pain. Negative for pelvic pain and vaginal bleeding.   Neurological: Negative.         PHYSICAL EXAM:    /90   Pulse 62   Temp 97 °F (36.1 °C) (Temporal)   Resp 16   Ht 5' 6\" (1.676 m)   Wt 202 lb (91.6 kg)   SpO2 98%   BMI 32.60 kg/m²   Physical Exam  Constitutional:       Appearance: Normal appearance.   Cardiovascular:      Rate and Rhythm: Normal rate.   Pulmonary:      Effort: Pulmonary effort is normal.      Breath sounds: Normal breath sounds.   Genitourinary:     Vagina: Vaginal discharge and tenderness present.      Cervix: Normal.   Skin:     General: Skin is warm and dry.   Neurological:      Mental Status: She is alert.       ASSESSMENT/ PLAN:     Assessment & Plan  Vaginal irritation and possible infection  - Perform vaginitis panel for bacterial vaginosis, trichomonas, and other infections.  - Prescribe fluconazole 2 tablets; take second tablet after 72 hours if symptoms persist.  - If vaginitis panel is positive for bacterial vaginosis, prescribe oral metronidazole and advise no alcohol consumption during treatment.  - Order STD screening   - Advise follow-up with gynecologist for further management of lichen sclerosus     General Health Maintenance  Due for routine screenings. Scheduled for colonoscopy in October.  - Schedule colonoscopy with Dr. Dominguez in October.  - If Pap and HPV tests are negative, follow current guidelines for routine screening in five years unless new concerns develop.       Health Maintenance Due   Topic Date Due    Annual Physical  Never done    Colorectal Cancer Screening  Never done    Pneumococcal Vaccine: 50+ Years (1 of 2 - PCV) Never done    DTaP,Tdap,and Td Vaccines (1 - Tdap) Never done    Zoster Vaccines (1 of 2) Never done    Pap Smear  06/05/2015    COVID-19 Vaccine (3 - 2024-25 season) 09/01/2024            The following individual(s) verbally consented to be recorded using ambient AI listening technology and understand that they can each  withdraw their consent to this listening technology at any point by asking the clinician to turn off or pause the recording:    Patient name: Victoria Eric      Pt indicates understanding and agrees to the plan.     Follow up as needed    JESSICA Alonso          [1]   Allergies  Allergen Reactions    Dairy Products OTHER (SEE COMMENTS)     pimples    Sulfa Antibiotics    [2]   Current Outpatient Medications   Medication Sig Dispense Refill    ondansetron (ZOFRAN) 4 mg tablet TAKE ONE TABLET BY MOUTH EVERY 12 (TWELVE) HOURS AS NEEDED FOR NAUSEA (VOMITING) for flying.      Ipratropium-Albuterol (DUONEB IN) Inhale into the lungs.      albuterol 108 (90 Base) MCG/ACT Inhalation Aero Soln Inhale 2 puffs into the lungs every 6 (six) hours as needed for Wheezing. 1 each 0    exemestane 25 MG Oral Tab Take 1 tablet (25 mg total) by mouth daily.      PEG 3350-KCl-Na Bicarb-NaCl 420 g Oral Recon Soln Take as directed by physician 4000 mL 0    amoxicillin clavulanate 875-125 MG Oral Tab Take 1 tablet by mouth in the morning and 1 tablet before bedtime. (Patient not taking: Reported on 7/12/2025)      fluticasone-salmeterol 115-21 MCG/ACT Inhalation Aerosol Inhale 1 puff into the lungs 2 (two) times daily. (Patient not taking: Reported on 7/12/2025) 1 each 0    propranolol 10 MG Oral Tab Take 1 tablet (10 mg total) by mouth 3 (three) times daily. Takes 2 PO bid.  For essential tremor. (Patient not taking: Reported on 7/12/2025)      predniSONE 20 MG Oral Tab 2 PO once daily for 5 days, 1 PO daily for 2 days. (Patient not taking: Reported on 7/12/2025) 12 tablet 0    Doxycycline Hyclate 100 MG Oral Tab Take 1 tablet (100 mg total) by mouth 2 (two) times daily. (Patient not taking: Reported on 7/12/2025) 20 tablet 0    benzonatate 200 MG Oral Cap Take 1 capsule (200 mg total) by mouth 3 (three) times daily as needed for cough. (Patient not taking: Reported on 7/12/2025) 30 capsule 0   [3]   Past Medical History:   Anxiety     Breast cancer (HCC)    left breast cancer-LCIS

## 2025-07-13 LAB
BV BACTERIA DNA VAG QL NAA+PROBE: NEGATIVE
C GLABRATA DNA VAG QL NAA+PROBE: NEGATIVE
C KRUSEI DNA VAG QL NAA+PROBE: NEGATIVE
CANDIDA DNA VAG QL NAA+PROBE: NEGATIVE
T VAGINALIS DNA VAG QL NAA+PROBE: NEGATIVE

## 2025-07-14 LAB — HPV E6+E7 MRNA CVX QL NAA+PROBE: NEGATIVE

## 2025-07-16 ENCOUNTER — LAB ENCOUNTER (OUTPATIENT)
Dept: LAB | Age: 65
End: 2025-07-16
Payer: COMMERCIAL

## 2025-07-16 DIAGNOSIS — Z11.3 SCREENING EXAMINATION FOR STD (SEXUALLY TRANSMITTED DISEASE): ICD-10-CM

## 2025-07-16 LAB
HBV SURFACE AB SER QL: NONREACTIVE
HBV SURFACE AB SERPL IA-ACNC: <3.1 MIU/ML
HBV SURFACE AG SER-ACNC: <0.1 [IU]/L
HBV SURFACE AG SERPL QL IA: NONREACTIVE
HCV AB SERPL QL IA: NONREACTIVE
T PALLIDUM AB SER QL IA: NONREACTIVE

## 2025-07-16 PROCEDURE — 86780 TREPONEMA PALLIDUM: CPT

## 2025-07-16 PROCEDURE — 87591 N.GONORRHOEAE DNA AMP PROB: CPT

## 2025-07-16 PROCEDURE — 87389 HIV-1 AG W/HIV-1&-2 AB AG IA: CPT

## 2025-07-16 PROCEDURE — 86706 HEP B SURFACE ANTIBODY: CPT

## 2025-07-16 PROCEDURE — 86803 HEPATITIS C AB TEST: CPT

## 2025-07-16 PROCEDURE — 87491 CHLMYD TRACH DNA AMP PROBE: CPT

## 2025-07-16 PROCEDURE — 87340 HEPATITIS B SURFACE AG IA: CPT

## 2025-07-17 LAB
C TRACH DNA SPEC QL NAA+PROBE: NEGATIVE
N GONORRHOEA DNA SPEC QL NAA+PROBE: NEGATIVE

## (undated) NOTE — ED AVS SNAPSHOT
Emma Ravi   MRN: [id number]    Department:  [facility] Emergency Department   Date of Visit:  1/18/2018           Disclosure     Insurance plans vary and the physician(s) referred by the ER may not be covered by your plan.  Please contact your i tell this physician (or your personal doctor if your instructions are to return to your personal doctor) about any new or lasting problems. The primary care or specialist physician will see patients referred from the [facility] Emergency Department.  Aubree Dixon

## (undated) NOTE — LETTER
Date: 4/7/2025    Patient Name: Victoria Eric          To Whom it may concern:    This letter has been written at the patient's request. The above patient was seen at [facility] for treatment of a medical condition.  Please excuse her absence.  Return to work when fever free for 24 hours, symptoms are improving, and feeling well enough.           Sincerely,    Peyman Ortiz PA